# Patient Record
Sex: FEMALE | Race: WHITE | NOT HISPANIC OR LATINO | Employment: FULL TIME | URBAN - METROPOLITAN AREA
[De-identification: names, ages, dates, MRNs, and addresses within clinical notes are randomized per-mention and may not be internally consistent; named-entity substitution may affect disease eponyms.]

---

## 2017-03-04 DIAGNOSIS — F32.A DEPRESSION: ICD-10-CM

## 2017-03-06 RX ORDER — CITALOPRAM 20 MG/1
TABLET, FILM COATED ORAL
Qty: 90 TABLET | Refills: 3 | Status: SHIPPED | OUTPATIENT
Start: 2017-03-06 | End: 2017-12-26 | Stop reason: SDUPTHER

## 2017-05-26 ENCOUNTER — TELEPHONE (OUTPATIENT)
Dept: FAMILY MEDICINE | Facility: CLINIC | Age: 37
End: 2017-05-26

## 2017-05-26 DIAGNOSIS — E03.9 HYPOTHYROIDISM, UNSPECIFIED TYPE: Primary | ICD-10-CM

## 2017-05-26 NOTE — TELEPHONE ENCOUNTER
----- Message from RT Ele sent at 5/26/2017  9:41 AM CDT -----  Contact: pt    pt , requesting a call back to confirm if her lab test orders were approved, thanks.

## 2017-05-26 NOTE — TELEPHONE ENCOUNTER
----- Message from Irasema Coto sent at 5/25/2017 11:43 AM CDT -----  Contact: patient  Patient calling in regards to requesting a lab order be put in to check her Thyroid levels. Call the patient when the order is put in. Please advise.  Call back   Thanks!

## 2017-05-29 ENCOUNTER — LAB VISIT (OUTPATIENT)
Dept: LAB | Facility: HOSPITAL | Age: 37
End: 2017-05-29
Attending: INTERNAL MEDICINE
Payer: COMMERCIAL

## 2017-05-29 DIAGNOSIS — Z13.9 ENCOUNTER FOR SCREENING, UNSPECIFIED: ICD-10-CM

## 2017-05-29 DIAGNOSIS — E03.9 HYPOTHYROIDISM, UNSPECIFIED TYPE: ICD-10-CM

## 2017-05-29 DIAGNOSIS — E03.4 HYPOTHYROIDISM DUE TO ACQUIRED ATROPHY OF THYROID: ICD-10-CM

## 2017-05-29 LAB
T4 SERPL-MCNC: 4.8 UG/DL
TSH SERPL DL<=0.005 MIU/L-ACNC: 1.65 UIU/ML

## 2017-05-29 PROCEDURE — 86803 HEPATITIS C AB TEST: CPT

## 2017-05-29 PROCEDURE — 36415 COLL VENOUS BLD VENIPUNCTURE: CPT | Mod: PO

## 2017-05-29 PROCEDURE — 84436 ASSAY OF TOTAL THYROXINE: CPT

## 2017-05-29 PROCEDURE — 84443 ASSAY THYROID STIM HORMONE: CPT

## 2017-05-30 LAB — HCV AB SERPL QL IA: NEGATIVE

## 2017-07-02 DIAGNOSIS — E03.4 HYPOTHYROIDISM DUE TO ACQUIRED ATROPHY OF THYROID: ICD-10-CM

## 2017-07-03 RX ORDER — LEVOTHYROXINE SODIUM 75 UG/1
TABLET ORAL
Qty: 90 TABLET | Refills: 0 | Status: SHIPPED | OUTPATIENT
Start: 2017-07-03 | End: 2017-10-07 | Stop reason: SDUPTHER

## 2017-10-07 DIAGNOSIS — E03.4 HYPOTHYROIDISM DUE TO ACQUIRED ATROPHY OF THYROID: ICD-10-CM

## 2017-10-09 NOTE — PROGRESS NOTES
Refill Authorization Note     is requesting a refill authorization.    Brief assessment and rational for refill: APPROVE: prr  Name of medication: LEVOTHYROXINE 0.075MG (75MCG) TABS  How patient will take medication: t1t po daily   Amount/Quantity of medication ordered: 90d   Medication reconciliation completed: No        Refills Authorized: Yes  If authorized number of refills: 1        Medication Therapy Plan: last tsh WNL.  Approve 6 mo   Comments:   Lab Results   Component Value Date    TSH 1.646 05/29/2017    W8NZQBT 4.8 05/29/2017

## 2017-10-12 RX ORDER — LEVOTHYROXINE SODIUM 75 UG/1
TABLET ORAL
Qty: 90 TABLET | Refills: 1 | Status: SHIPPED | OUTPATIENT
Start: 2017-10-12 | End: 2017-12-26 | Stop reason: SDUPTHER

## 2017-12-21 ENCOUNTER — TELEPHONE (OUTPATIENT)
Dept: FAMILY MEDICINE | Facility: CLINIC | Age: 37
End: 2017-12-21

## 2017-12-21 DIAGNOSIS — F32.A DEPRESSION, UNSPECIFIED DEPRESSION TYPE: ICD-10-CM

## 2017-12-21 DIAGNOSIS — E03.9 HYPOTHYROIDISM, UNSPECIFIED TYPE: Primary | ICD-10-CM

## 2017-12-21 NOTE — TELEPHONE ENCOUNTER
Ordered, please link all labs I ordered together; advise her she is due for cholesterol as well, complete labs fasting

## 2017-12-21 NOTE — TELEPHONE ENCOUNTER
----- Message from Amira Ruiz sent at 12/21/2017  2:07 PM CST -----  Patient is calling concerning if she needs an appointment before she can get another year of refills of levothyroxine (SYNTHROID) 75 MCG tablet and citalopram (CELEXA) 20 MG tablet. Please call to advise or remit to:      [facility] [address] [place] [address] & [address] [address]  Phone: [phone] Fax: [phone]    Call when completed at [phone].

## 2017-12-21 NOTE — TELEPHONE ENCOUNTER
Spoke with pt. Scheduled appt with Sonia Latif NP for 12/26/17. Pt is requesting TSH prior.    Pended. Pt plans to walk in tomorrow or Saturday to have completed.

## 2017-12-22 ENCOUNTER — LAB VISIT (OUTPATIENT)
Dept: LAB | Facility: HOSPITAL | Age: 37
End: 2017-12-22
Attending: INTERNAL MEDICINE
Payer: COMMERCIAL

## 2017-12-22 DIAGNOSIS — F32.A DEPRESSION, UNSPECIFIED DEPRESSION TYPE: ICD-10-CM

## 2017-12-22 DIAGNOSIS — E03.9 HYPOTHYROIDISM, UNSPECIFIED TYPE: ICD-10-CM

## 2017-12-22 LAB
ALBUMIN SERPL BCP-MCNC: 3.8 G/DL
ALP SERPL-CCNC: 77 U/L
ALT SERPL W/O P-5'-P-CCNC: 26 U/L
ANION GAP SERPL CALC-SCNC: 8 MMOL/L
AST SERPL-CCNC: 21 U/L
BASOPHILS # BLD AUTO: 0.04 K/UL
BASOPHILS NFR BLD: 0.7 %
BILIRUB SERPL-MCNC: 0.4 MG/DL
BUN SERPL-MCNC: 11 MG/DL
CALCIUM SERPL-MCNC: 9.4 MG/DL
CHLORIDE SERPL-SCNC: 108 MMOL/L
CHOLEST SERPL-MCNC: 184 MG/DL
CHOLEST/HDLC SERPL: 3 {RATIO}
CO2 SERPL-SCNC: 24 MMOL/L
CREAT SERPL-MCNC: 0.8 MG/DL
DIFFERENTIAL METHOD: NORMAL
EOSINOPHIL # BLD AUTO: 0.2 K/UL
EOSINOPHIL NFR BLD: 3 %
ERYTHROCYTE [DISTWIDTH] IN BLOOD BY AUTOMATED COUNT: 12.8 %
EST. GFR  (AFRICAN AMERICAN): >60 ML/MIN/1.73 M^2
EST. GFR  (NON AFRICAN AMERICAN): >60 ML/MIN/1.73 M^2
GLUCOSE SERPL-MCNC: 93 MG/DL
HCT VFR BLD AUTO: 39.2 %
HDLC SERPL-MCNC: 61 MG/DL
HDLC SERPL: 33.2 %
HGB BLD-MCNC: 13.5 G/DL
IMM GRANULOCYTES # BLD AUTO: 0.01 K/UL
IMM GRANULOCYTES NFR BLD AUTO: 0.2 %
LDLC SERPL CALC-MCNC: 109.8 MG/DL
LYMPHOCYTES # BLD AUTO: 2 K/UL
LYMPHOCYTES NFR BLD: 34.2 %
MCH RBC QN AUTO: 30.4 PG
MCHC RBC AUTO-ENTMCNC: 34.4 G/DL
MCV RBC AUTO: 88 FL
MONOCYTES # BLD AUTO: 0.5 K/UL
MONOCYTES NFR BLD: 8.4 %
NEUTROPHILS # BLD AUTO: 3.1 K/UL
NEUTROPHILS NFR BLD: 53.5 %
NONHDLC SERPL-MCNC: 123 MG/DL
NRBC BLD-RTO: 0 /100 WBC
PLATELET # BLD AUTO: 230 K/UL
PMV BLD AUTO: 11.3 FL
POTASSIUM SERPL-SCNC: 4.4 MMOL/L
PROT SERPL-MCNC: 7.2 G/DL
RBC # BLD AUTO: 4.44 M/UL
SODIUM SERPL-SCNC: 140 MMOL/L
TRIGL SERPL-MCNC: 66 MG/DL
TSH SERPL DL<=0.005 MIU/L-ACNC: 2.65 UIU/ML
WBC # BLD AUTO: 5.71 K/UL

## 2017-12-22 PROCEDURE — 80061 LIPID PANEL: CPT

## 2017-12-22 PROCEDURE — 80053 COMPREHEN METABOLIC PANEL: CPT

## 2017-12-22 PROCEDURE — 36415 COLL VENOUS BLD VENIPUNCTURE: CPT | Mod: PO

## 2017-12-22 PROCEDURE — 84443 ASSAY THYROID STIM HORMONE: CPT

## 2017-12-22 PROCEDURE — 85025 COMPLETE CBC W/AUTO DIFF WBC: CPT

## 2017-12-26 ENCOUNTER — OFFICE VISIT (OUTPATIENT)
Dept: FAMILY MEDICINE | Facility: CLINIC | Age: 37
End: 2017-12-26
Payer: COMMERCIAL

## 2017-12-26 VITALS
OXYGEN SATURATION: 98 % | HEART RATE: 72 BPM | HEIGHT: 68 IN | DIASTOLIC BLOOD PRESSURE: 70 MMHG | SYSTOLIC BLOOD PRESSURE: 112 MMHG | TEMPERATURE: 99 F | WEIGHT: 271.81 LBS | RESPIRATION RATE: 16 BRPM | BODY MASS INDEX: 41.19 KG/M2

## 2017-12-26 DIAGNOSIS — E03.4 HYPOTHYROIDISM DUE TO ACQUIRED ATROPHY OF THYROID: ICD-10-CM

## 2017-12-26 DIAGNOSIS — F32.A DEPRESSION, UNSPECIFIED DEPRESSION TYPE: ICD-10-CM

## 2017-12-26 PROCEDURE — 99999 PR PBB SHADOW E&M-EST. PATIENT-LVL III: CPT | Mod: PBBFAC,,, | Performed by: NURSE PRACTITIONER

## 2017-12-26 PROCEDURE — 99214 OFFICE O/P EST MOD 30 MIN: CPT | Mod: S$GLB,,, | Performed by: NURSE PRACTITIONER

## 2017-12-26 RX ORDER — CITALOPRAM 20 MG/1
TABLET, FILM COATED ORAL
Qty: 90 TABLET | Refills: 1 | Status: SHIPPED | OUTPATIENT
Start: 2017-12-26 | End: 2018-04-28 | Stop reason: SDUPTHER

## 2017-12-26 RX ORDER — LEVOTHYROXINE SODIUM 75 UG/1
TABLET ORAL
Qty: 90 TABLET | Refills: 3 | Status: SHIPPED | OUTPATIENT
Start: 2017-12-26 | End: 2018-05-14 | Stop reason: SDUPTHER

## 2017-12-26 RX ORDER — TRANEXAMIC ACID 650 MG/1
650 TABLET ORAL ONCE
COMMUNITY
Start: 2017-12-21 | End: 2018-07-12

## 2017-12-26 NOTE — PROGRESS NOTES
Subjective:       Patient ID: Jimena Lezama is a 36 y.o. female.    Chief Complaint: Hypothyroidism (f/u)    HPI   Ms. Lezama is a new patient to me. She presents today for lab review, hypothyroidism and depression FU    Labs all WNL  Hypothyroid-controlled on 75mcg levothyroxine  Depression: controlled on 30mg celexa   Vitals:    12/26/17 0937   BP: 112/70   Pulse: 72   Resp: 16   Temp: 99.3 °F (37.4 °C)     Review of Systems   Constitutional: Negative.  Negative for diaphoresis and fever.   HENT: Negative.  Negative for facial swelling and trouble swallowing.    Eyes: Negative.  Negative for discharge and redness.   Respiratory: Negative.  Negative for cough and shortness of breath.    Cardiovascular: Negative.  Negative for chest pain and palpitations.   Gastrointestinal: Negative.  Negative for abdominal pain and vomiting.   Genitourinary: Negative.  Negative for difficulty urinating and flank pain.   Musculoskeletal: Negative.  Negative for back pain and neck pain.   Skin: Negative.  Negative for rash and wound.   Neurological: Negative.  Negative for dizziness and light-headedness.   Hematological: Negative.    Psychiatric/Behavioral: Negative.  Negative for confusion. The patient is not nervous/anxious.        Past Medical History:   Diagnosis Date    Hypothyroid     Irritable bowel     Obesity      Objective:      Physical Exam   Constitutional: She is oriented to person, place, and time. She does not have a sickly appearance. No distress.   HENT:   Head: Normocephalic.   Right Ear: Hearing, tympanic membrane, external ear and ear canal normal.   Left Ear: Hearing, tympanic membrane, external ear and ear canal normal.   Nose: Nose normal.   Mouth/Throat: Uvula is midline, oropharynx is clear and moist and mucous membranes are normal.   Eyes: Conjunctivae and lids are normal.   Neck: Trachea normal and normal range of motion. Neck supple. No JVD present. No tracheal deviation present.    Cardiovascular: Normal rate, regular rhythm, S1 normal, S2 normal and normal heart sounds.    Pulmonary/Chest: Effort normal and breath sounds normal. She exhibits no tenderness.   Abdominal: Normal appearance. She exhibits no distension.   Musculoskeletal: Normal range of motion. She exhibits no edema or deformity.   Neurological: She is alert and oriented to person, place, and time.   Skin: Skin is warm and dry. She is not diaphoretic. No pallor.   Psychiatric: She has a normal mood and affect. Her speech is normal and behavior is normal. Judgment and thought content normal. Cognition and memory are normal.   Nursing note and vitals reviewed.      Assessment:       1. Hypothyroidism due to acquired atrophy of thyroid    2. Depression, unspecified depression type        Plan:       Hypothyroidism due to acquired atrophy of thyroid  -     levothyroxine (SYNTHROID) 75 MCG tablet; TAKE 1 TABLET(75 MCG) BY MOUTH EVERY DAY  Dispense: 90 tablet; Refill: 3    Depression, unspecified depression type  -     citalopram (CELEXA) 20 MG tablet; TAKE 1 1/2 TABLET(30 MG) BY MOUTH EVERY DAY  Dispense: 90 tablet; Refill: 1  -     Comprehensive metabolic panel; Future; Expected date: 12/26/2017        Return in about 6 months (around 6/26/2018) for annual with PCP.   FU with me PRN

## 2018-01-10 ENCOUNTER — IMMUNIZATION (OUTPATIENT)
Dept: URGENT CARE | Facility: CLINIC | Age: 38
End: 2018-01-10
Payer: COMMERCIAL

## 2018-01-10 PROCEDURE — 90686 IIV4 VACC NO PRSV 0.5 ML IM: CPT | Mod: S$GLB,,, | Performed by: EMERGENCY MEDICINE

## 2018-01-10 PROCEDURE — 90471 IMMUNIZATION ADMIN: CPT | Mod: S$GLB,,, | Performed by: EMERGENCY MEDICINE

## 2018-04-28 ENCOUNTER — TELEPHONE (OUTPATIENT)
Dept: FAMILY MEDICINE | Facility: CLINIC | Age: 38
End: 2018-04-28

## 2018-04-28 DIAGNOSIS — F32.A DEPRESSION, UNSPECIFIED DEPRESSION TYPE: ICD-10-CM

## 2018-04-29 RX ORDER — CITALOPRAM 20 MG/1
TABLET, FILM COATED ORAL
Qty: 30 TABLET | Refills: 0 | Status: SHIPPED | OUTPATIENT
Start: 2018-04-29 | End: 2018-05-14 | Stop reason: SDUPTHER

## 2018-05-14 DIAGNOSIS — F32.A DEPRESSION, UNSPECIFIED DEPRESSION TYPE: ICD-10-CM

## 2018-05-14 DIAGNOSIS — E03.4 HYPOTHYROIDISM DUE TO ACQUIRED ATROPHY OF THYROID: ICD-10-CM

## 2018-05-14 RX ORDER — LEVOTHYROXINE SODIUM 75 UG/1
TABLET ORAL
Qty: 90 TABLET | Refills: 1 | Status: SHIPPED | OUTPATIENT
Start: 2018-05-14 | End: 2018-07-12 | Stop reason: SDUPTHER

## 2018-05-14 RX ORDER — CITALOPRAM 20 MG/1
20 TABLET, FILM COATED ORAL DAILY
Qty: 90 TABLET | Refills: 1 | Status: SHIPPED | OUTPATIENT
Start: 2018-05-14 | End: 2018-07-12 | Stop reason: SDUPTHER

## 2018-07-12 ENCOUNTER — OFFICE VISIT (OUTPATIENT)
Dept: FAMILY MEDICINE | Facility: CLINIC | Age: 38
End: 2018-07-12
Payer: COMMERCIAL

## 2018-07-12 ENCOUNTER — LAB VISIT (OUTPATIENT)
Dept: LAB | Facility: HOSPITAL | Age: 38
End: 2018-07-12
Attending: NURSE PRACTITIONER
Payer: COMMERCIAL

## 2018-07-12 VITALS
OXYGEN SATURATION: 97 % | SYSTOLIC BLOOD PRESSURE: 98 MMHG | DIASTOLIC BLOOD PRESSURE: 70 MMHG | RESPIRATION RATE: 16 BRPM | TEMPERATURE: 98 F | HEART RATE: 62 BPM | HEIGHT: 68 IN | BODY MASS INDEX: 41.28 KG/M2 | WEIGHT: 272.38 LBS

## 2018-07-12 DIAGNOSIS — E03.4 HYPOTHYROIDISM DUE TO ACQUIRED ATROPHY OF THYROID: ICD-10-CM

## 2018-07-12 DIAGNOSIS — F32.A DEPRESSION, UNSPECIFIED DEPRESSION TYPE: ICD-10-CM

## 2018-07-12 LAB
ALBUMIN SERPL BCP-MCNC: 4.3 G/DL
ALP SERPL-CCNC: 73 U/L
ALT SERPL W/O P-5'-P-CCNC: 18 U/L
ANION GAP SERPL CALC-SCNC: 7 MMOL/L
AST SERPL-CCNC: 22 U/L
BILIRUB SERPL-MCNC: 0.4 MG/DL
BUN SERPL-MCNC: 12 MG/DL
CALCIUM SERPL-MCNC: 9.5 MG/DL
CHLORIDE SERPL-SCNC: 107 MMOL/L
CO2 SERPL-SCNC: 25 MMOL/L
CREAT SERPL-MCNC: 0.8 MG/DL
EST. GFR  (AFRICAN AMERICAN): >60 ML/MIN/1.73 M^2
EST. GFR  (NON AFRICAN AMERICAN): >60 ML/MIN/1.73 M^2
GLUCOSE SERPL-MCNC: 100 MG/DL
POTASSIUM SERPL-SCNC: 4.2 MMOL/L
PROT SERPL-MCNC: 7.4 G/DL
SODIUM SERPL-SCNC: 139 MMOL/L
TSH SERPL DL<=0.005 MIU/L-ACNC: 1.7 UIU/ML

## 2018-07-12 PROCEDURE — 84443 ASSAY THYROID STIM HORMONE: CPT

## 2018-07-12 PROCEDURE — 80053 COMPREHEN METABOLIC PANEL: CPT

## 2018-07-12 PROCEDURE — 3008F BODY MASS INDEX DOCD: CPT | Mod: CPTII,S$GLB,, | Performed by: NURSE PRACTITIONER

## 2018-07-12 PROCEDURE — 99214 OFFICE O/P EST MOD 30 MIN: CPT | Mod: S$GLB,,, | Performed by: NURSE PRACTITIONER

## 2018-07-12 PROCEDURE — 99999 PR PBB SHADOW E&M-EST. PATIENT-LVL IV: CPT | Mod: PBBFAC,,, | Performed by: NURSE PRACTITIONER

## 2018-07-12 PROCEDURE — 36415 COLL VENOUS BLD VENIPUNCTURE: CPT | Mod: PO

## 2018-07-12 RX ORDER — LEVOTHYROXINE SODIUM 75 UG/1
TABLET ORAL
Qty: 90 TABLET | Refills: 1 | Status: SHIPPED | OUTPATIENT
Start: 2018-07-12 | End: 2019-02-03 | Stop reason: SDUPTHER

## 2018-07-12 RX ORDER — CITALOPRAM 20 MG/1
20 TABLET, FILM COATED ORAL DAILY
Qty: 90 TABLET | Refills: 1 | Status: SHIPPED | OUTPATIENT
Start: 2018-07-12 | End: 2018-11-12 | Stop reason: SDUPTHER

## 2018-07-12 NOTE — PROGRESS NOTES
Subjective:       Patient ID: Jimena Lezama is a 37 y.o. female.    Chief Complaint: Medication Refill    Ms. Lezama is a known patient to me. She presents today for 6 month follow up    HPI   Hypothyroidism: stable  Depression: controlled on celexa; due for labs    She is without complaints  Vitals:    07/12/18 0929   BP: 98/70   Pulse: 62   Resp: 16   Temp: 98.4 °F (36.9 °C)     Review of Systems   Constitutional: Negative for diaphoresis and fever.   HENT: Negative for facial swelling and trouble swallowing.    Eyes: Negative for discharge and redness.   Respiratory: Negative for cough and shortness of breath.    Cardiovascular: Negative for chest pain and palpitations.   Gastrointestinal: Negative for diarrhea and nausea.   Genitourinary: Negative for difficulty urinating.   Musculoskeletal: Negative for gait problem.   Skin: Negative for rash and wound.   Neurological: Negative for dizziness, facial asymmetry and speech difficulty.   Psychiatric/Behavioral: Negative for confusion. The patient is not nervous/anxious.        Past Medical History:   Diagnosis Date    Hypothyroid     Irritable bowel     Obesity      Objective:      Physical Exam   Constitutional: She is oriented to person, place, and time. She does not have a sickly appearance. No distress.   HENT:   Head: Normocephalic.   Right Ear: Hearing normal.   Left Ear: Hearing normal.   Nose: Nose normal.   Eyes: Conjunctivae and lids are normal.   Neck: No JVD present. No tracheal deviation present.   Cardiovascular: Normal rate and normal heart sounds.    Pulmonary/Chest: Effort normal and breath sounds normal.   Abdominal: Normal appearance. She exhibits no distension.   Musculoskeletal: She exhibits no deformity.   Neurological: She is alert and oriented to person, place, and time.   Skin: She is not diaphoretic. No pallor.   Psychiatric: She has a normal mood and affect. Her speech is normal and behavior is normal. Judgment and thought  content normal. Cognition and memory are normal.   Nursing note and vitals reviewed.      Assessment:       1. Depression, unspecified depression type    2. Hypothyroidism due to acquired atrophy of thyroid        Plan:       Depression, unspecified depression type  -     citalopram (CELEXA) 20 MG tablet; Take 1 tablet (20 mg total) by mouth once daily.  Dispense: 90 tablet; Refill: 1    Hypothyroidism due to acquired atrophy of thyroid  -     levothyroxine (SYNTHROID) 75 MCG tablet; TAKE 1 TABLET(75 MCG) BY MOUTH EVERY DAY  Dispense: 90 tablet; Refill: 1  -     TSH; Future; Expected date: 07/12/2018      Follow-up in about 6 months (around 1/12/2019) for annual with PCP, with me sooner as needed.

## 2018-11-12 ENCOUNTER — TELEPHONE (OUTPATIENT)
Dept: FAMILY MEDICINE | Facility: CLINIC | Age: 38
End: 2018-11-12

## 2018-11-12 DIAGNOSIS — F32.A DEPRESSION, UNSPECIFIED DEPRESSION TYPE: ICD-10-CM

## 2018-11-12 RX ORDER — CITALOPRAM 20 MG/1
TABLET, FILM COATED ORAL
Qty: 90 TABLET | Refills: 0 | Status: SHIPPED | OUTPATIENT
Start: 2018-11-12 | End: 2019-04-24 | Stop reason: SDUPTHER

## 2018-12-06 ENCOUNTER — OFFICE VISIT (OUTPATIENT)
Dept: FAMILY MEDICINE | Facility: CLINIC | Age: 38
End: 2018-12-06
Payer: COMMERCIAL

## 2018-12-06 VITALS
TEMPERATURE: 98 F | BODY MASS INDEX: 37.44 KG/M2 | HEIGHT: 68 IN | SYSTOLIC BLOOD PRESSURE: 112 MMHG | WEIGHT: 247 LBS | OXYGEN SATURATION: 98 % | DIASTOLIC BLOOD PRESSURE: 78 MMHG | HEART RATE: 53 BPM

## 2018-12-06 DIAGNOSIS — J02.9 PHARYNGITIS, UNSPECIFIED ETIOLOGY: Primary | ICD-10-CM

## 2018-12-06 LAB
FLUAV AG SPEC QL IA: NEGATIVE
FLUBV AG SPEC QL IA: NEGATIVE
SPECIMEN SOURCE: NORMAL

## 2018-12-06 PROCEDURE — 87400 INFLUENZA A/B EACH AG IA: CPT | Mod: PO

## 2018-12-06 PROCEDURE — 87070 CULTURE OTHR SPECIMN AEROBIC: CPT

## 2018-12-06 PROCEDURE — 3008F BODY MASS INDEX DOCD: CPT | Mod: CPTII,S$GLB,, | Performed by: NURSE PRACTITIONER

## 2018-12-06 PROCEDURE — 99999 PR PBB SHADOW E&M-EST. PATIENT-LVL III: CPT | Mod: PBBFAC,,, | Performed by: NURSE PRACTITIONER

## 2018-12-06 PROCEDURE — 99213 OFFICE O/P EST LOW 20 MIN: CPT | Mod: S$GLB,,, | Performed by: NURSE PRACTITIONER

## 2018-12-06 RX ORDER — PROMETHAZINE HYDROCHLORIDE AND DEXTROMETHORPHAN HYDROBROMIDE 6.25; 15 MG/5ML; MG/5ML
5 SYRUP ORAL 3 TIMES DAILY PRN
Qty: 240 ML | Refills: 0 | Status: SHIPPED | OUTPATIENT
Start: 2018-12-06 | End: 2018-12-16

## 2018-12-06 NOTE — PROGRESS NOTES
Subjective:       Patient ID: Jimena Lezama is a 37 y.o. female.    Chief Complaint: Sore Throat    Patient is having sore throat x 3 days, taking advil/tylenol.       Review of Systems   Constitutional: Negative for fatigue.   HENT: Positive for ear pain. Negative for postnasal drip, rhinorrhea, sinus pressure and sinus pain.    Respiratory: Positive for cough. Negative for shortness of breath and wheezing.    Musculoskeletal: Positive for myalgias and neck pain.   Neurological: Positive for headaches.       Objective:      Physical Exam   Constitutional: She is oriented to person, place, and time. No distress.   HENT:   Head: Normocephalic and atraumatic.   Eyes: Pupils are equal, round, and reactive to light.   Cardiovascular: Normal rate and regular rhythm.   Pulmonary/Chest: Effort normal and breath sounds normal. No stridor. No respiratory distress.   Musculoskeletal: She exhibits no edema.   Neurological: She is alert and oriented to person, place, and time.   Skin: She is not diaphoretic.   Psychiatric: She has a normal mood and affect. Her behavior is normal.   Vitals reviewed.      Assessment:       1. Pharyngitis, unspecified etiology        Plan:       1. Pharyngitis, unspecified etiology  Claritin and mucinex daily, magic mouthwash and phenergan dm prn. Follow up if symptoms are lasting more than 7 days or if they get worse.   - POCT Rapid Strep A  - Influenza antigen Nasopharyngeal Swab  - Throat culture  - promethazine-dextromethorphan (PROMETHAZINE-DM) 6.25-15 mg/5 mL Syrp; Take 5 mLs by mouth 3 (three) times daily as needed.  Dispense: 240 mL; Refill: 0  - (Magic mouthwash) 1:1:1 Benadryl 12.5mg/5ml liq, aluminum & magnesium hydroxide-simehticone (Maalox), lidocaine viscous 2%; 5-10 ml gargle and swallow every 4 hours as needed  Dispense: 450 mL; Refill: 0

## 2018-12-10 LAB — BACTERIA THROAT CULT: NORMAL

## 2018-12-13 ENCOUNTER — PATIENT MESSAGE (OUTPATIENT)
Dept: FAMILY MEDICINE | Facility: CLINIC | Age: 38
End: 2018-12-13

## 2019-02-03 DIAGNOSIS — E03.4 HYPOTHYROIDISM DUE TO ACQUIRED ATROPHY OF THYROID: ICD-10-CM

## 2019-02-03 RX ORDER — LEVOTHYROXINE SODIUM 75 UG/1
TABLET ORAL
Qty: 90 TABLET | Refills: 0 | Status: SHIPPED | OUTPATIENT
Start: 2019-02-03 | End: 2019-04-24 | Stop reason: SDUPTHER

## 2019-04-10 ENCOUNTER — PATIENT OUTREACH (OUTPATIENT)
Dept: ADMINISTRATIVE | Facility: HOSPITAL | Age: 39
End: 2019-04-10

## 2019-04-24 ENCOUNTER — LAB VISIT (OUTPATIENT)
Dept: LAB | Facility: HOSPITAL | Age: 39
End: 2019-04-24
Attending: INTERNAL MEDICINE
Payer: COMMERCIAL

## 2019-04-24 ENCOUNTER — OFFICE VISIT (OUTPATIENT)
Dept: FAMILY MEDICINE | Facility: CLINIC | Age: 39
End: 2019-04-24
Payer: COMMERCIAL

## 2019-04-24 VITALS
HEART RATE: 54 BPM | OXYGEN SATURATION: 97 % | DIASTOLIC BLOOD PRESSURE: 74 MMHG | HEIGHT: 68 IN | BODY MASS INDEX: 35.09 KG/M2 | WEIGHT: 231.5 LBS | SYSTOLIC BLOOD PRESSURE: 110 MMHG

## 2019-04-24 DIAGNOSIS — F32.A DEPRESSION, UNSPECIFIED DEPRESSION TYPE: ICD-10-CM

## 2019-04-24 DIAGNOSIS — Z00.00 ROUTINE PHYSICAL EXAMINATION: Primary | ICD-10-CM

## 2019-04-24 DIAGNOSIS — E03.4 HYPOTHYROIDISM DUE TO ACQUIRED ATROPHY OF THYROID: ICD-10-CM

## 2019-04-24 PROCEDURE — 99396 PR PREVENTIVE VISIT,EST,40-64: ICD-10-PCS | Mod: S$GLB,,, | Performed by: INTERNAL MEDICINE

## 2019-04-24 PROCEDURE — 99999 PR PBB SHADOW E&M-EST. PATIENT-LVL III: CPT | Mod: PBBFAC,,, | Performed by: INTERNAL MEDICINE

## 2019-04-24 PROCEDURE — 84443 ASSAY THYROID STIM HORMONE: CPT

## 2019-04-24 PROCEDURE — 99396 PREV VISIT EST AGE 40-64: CPT | Mod: S$GLB,,, | Performed by: INTERNAL MEDICINE

## 2019-04-24 PROCEDURE — 99999 PR PBB SHADOW E&M-EST. PATIENT-LVL III: ICD-10-PCS | Mod: PBBFAC,,, | Performed by: INTERNAL MEDICINE

## 2019-04-24 PROCEDURE — 36415 COLL VENOUS BLD VENIPUNCTURE: CPT | Mod: PO

## 2019-04-24 RX ORDER — CITALOPRAM 10 MG/1
10 TABLET ORAL DAILY
Qty: 30 TABLET | Refills: 11 | Status: SHIPPED | OUTPATIENT
Start: 2019-04-24 | End: 2019-05-24

## 2019-04-24 RX ORDER — LEVOTHYROXINE SODIUM 75 UG/1
75 TABLET ORAL
Qty: 90 TABLET | Refills: 3 | Status: SHIPPED | OUTPATIENT
Start: 2019-04-24 | End: 2020-01-02

## 2019-04-24 RX ORDER — CITALOPRAM 20 MG/1
TABLET, FILM COATED ORAL
Qty: 90 TABLET | Refills: 1 | Status: SHIPPED | OUTPATIENT
Start: 2019-04-24 | End: 2020-07-07 | Stop reason: SDUPTHER

## 2019-04-24 NOTE — PROGRESS NOTES
Subjective:       Patient ID: Jimena Lezama is a 38 y.o. female.    Chief Complaint: Annual Exam    Here for routine health maintenance.    Hypothyroid - controlled, but need check today  Depression - controlled; trouble weaning off.  Would like to stop it.     Review of Systems   Constitutional: Negative for appetite change and fever.   HENT: Negative for nosebleeds and trouble swallowing.    Eyes: Negative for discharge and visual disturbance.   Respiratory: Negative for choking and shortness of breath.    Cardiovascular: Negative for chest pain and palpitations.   Gastrointestinal: Negative for abdominal pain, nausea and vomiting.   Musculoskeletal: Negative for arthralgias and joint swelling.   Skin: Negative for rash and wound.   Neurological: Negative for dizziness and syncope.   Psychiatric/Behavioral: Negative for confusion and dysphoric mood.       Objective:      Vitals:    04/24/19 1656   BP: 110/74   Pulse: (!) 54     Physical Exam   Constitutional: She appears well-nourished.   Eyes: Conjunctivae and EOM are normal.   Neck: Trachea normal and normal range of motion. No thyromegaly present.   Cardiovascular: Normal heart sounds.   Edema negative   Pulmonary/Chest: Effort normal and breath sounds normal.   Abdominal: Soft. There is no hepatomegaly.   Musculoskeletal:   ROM normal bilateral  Strength normal bilateral   Neurological: She has normal reflexes. No cranial nerve deficit.   Skin: Skin is warm, dry and intact.   Psychiatric: She has a normal mood and affect.   Alert and Oriented    Vitals reviewed.        Assessment:       1. Routine physical examination    2. Hypothyroidism due to acquired atrophy of thyroid    3. Depression, unspecified depression type        Plan:       Routine physical examination  -     TSH; Future; Expected date: 10/21/2019  -     Lipid panel; Future; Expected date: 10/21/2019  -     Comprehensive metabolic panel; Future; Expected date: 10/21/2019  -     CBC auto  differential; Future; Expected date: 10/21/2019    Hypothyroidism due to acquired atrophy of thyroid  -     levothyroxine (SYNTHROID) 75 MCG tablet; Take 1 tablet (75 mcg total) by mouth before breakfast.  Dispense: 90 tablet; Refill: 3  -     TSH; Future; Expected date: 04/24/2019    Depression, unspecified depression type  -     citalopram (CELEXA) 10 MG tablet; Take 1 tablet (10 mg total) by mouth once daily.  Dispense: 30 tablet; Refill: 11  -     citalopram (CELEXA) 20 MG tablet; TAKE 1 TABLET(20 MG) BY MOUTH EVERY DAY  Dispense: 90 tablet; Refill: 1            Medication List with Changes/Refills   New Medications    CITALOPRAM (CELEXA) 10 MG TABLET    Take 1 tablet (10 mg total) by mouth once daily.   Current Medications    (MAGIC MOUTHWASH) 1:1:1 BENADRYL 12.5MG/5ML LIQ, ALUMINUM & MAGNESIUM HYDROXIDE-SIMEHTICONE (MAALOX), LIDOCAINE VISCOUS 2%    5-10 ml gargle and swallow every 4 hours as needed   Changed and/or Refilled Medications    Modified Medication Previous Medication    CITALOPRAM (CELEXA) 20 MG TABLET citalopram (CELEXA) 20 MG tablet       TAKE 1 TABLET(20 MG) BY MOUTH EVERY DAY    TAKE 1 TABLET(20 MG) BY MOUTH EVERY DAY    LEVOTHYROXINE (SYNTHROID) 75 MCG TABLET levothyroxine (SYNTHROID) 75 MCG tablet       Take 1 tablet (75 mcg total) by mouth before breakfast.    TAKE 1 TABLET(75 MCG) BY MOUTH EVERY DAY     Wellness reviewed  Will try wean slowly off Celexa.  Continue current management and monitor.    Counseled on regular exercise, maintenance of a healthy weight, balanced diet rich in fruits/vegetables and lean protein, and avoidance of unhealthy habits like smoking and excessive alcohol intake.   Also, counseled on importance of being compliant with medication, health appointments, diet and exercise.     Follow up in about 6 months (around 10/24/2019).  Sonia in 6 mo me in 1 yr

## 2019-04-25 LAB — TSH SERPL DL<=0.005 MIU/L-ACNC: 1.27 UIU/ML (ref 0.4–4)

## 2019-10-24 ENCOUNTER — PATIENT MESSAGE (OUTPATIENT)
Dept: FAMILY MEDICINE | Facility: CLINIC | Age: 39
End: 2019-10-24

## 2019-10-28 ENCOUNTER — PATIENT MESSAGE (OUTPATIENT)
Dept: FAMILY MEDICINE | Facility: CLINIC | Age: 39
End: 2019-10-28

## 2019-11-11 ENCOUNTER — PATIENT OUTREACH (OUTPATIENT)
Dept: ADMINISTRATIVE | Facility: HOSPITAL | Age: 39
End: 2019-11-11

## 2019-11-11 NOTE — PROGRESS NOTES
There are no preventive care reminders to display for this patient.  Future Appointments   Date Time Provider Department Center   11/25/2019  1:10 PM Jeffry Helms MD [facility]

## 2019-12-18 ENCOUNTER — PATIENT MESSAGE (OUTPATIENT)
Dept: FAMILY MEDICINE | Facility: CLINIC | Age: 39
End: 2019-12-18

## 2019-12-19 ENCOUNTER — LAB VISIT (OUTPATIENT)
Dept: LAB | Facility: HOSPITAL | Age: 39
End: 2019-12-19
Attending: INTERNAL MEDICINE
Payer: COMMERCIAL

## 2019-12-19 DIAGNOSIS — Z00.00 ROUTINE PHYSICAL EXAMINATION: ICD-10-CM

## 2019-12-19 LAB
ALBUMIN SERPL BCP-MCNC: 3.9 G/DL (ref 3.5–5.2)
ALP SERPL-CCNC: 70 U/L (ref 55–135)
ALT SERPL W/O P-5'-P-CCNC: 16 U/L (ref 10–44)
ANION GAP SERPL CALC-SCNC: 4 MMOL/L (ref 8–16)
AST SERPL-CCNC: 22 U/L (ref 10–40)
BASOPHILS # BLD AUTO: 0.06 K/UL (ref 0–0.2)
BASOPHILS NFR BLD: 1 % (ref 0–1.9)
BILIRUB SERPL-MCNC: 0.3 MG/DL (ref 0.1–1)
BUN SERPL-MCNC: 17 MG/DL (ref 6–20)
CALCIUM SERPL-MCNC: 9.1 MG/DL (ref 8.7–10.5)
CHLORIDE SERPL-SCNC: 107 MMOL/L (ref 95–110)
CHOLEST SERPL-MCNC: 192 MG/DL (ref 120–199)
CHOLEST/HDLC SERPL: 2.8 {RATIO} (ref 2–5)
CO2 SERPL-SCNC: 28 MMOL/L (ref 23–29)
CREAT SERPL-MCNC: 0.8 MG/DL (ref 0.5–1.4)
DIFFERENTIAL METHOD: ABNORMAL
EOSINOPHIL # BLD AUTO: 0.2 K/UL (ref 0–0.5)
EOSINOPHIL NFR BLD: 3.5 % (ref 0–8)
ERYTHROCYTE [DISTWIDTH] IN BLOOD BY AUTOMATED COUNT: 12.6 % (ref 11.5–14.5)
EST. GFR  (AFRICAN AMERICAN): >60 ML/MIN/1.73 M^2
EST. GFR  (NON AFRICAN AMERICAN): >60 ML/MIN/1.73 M^2
GLUCOSE SERPL-MCNC: 92 MG/DL (ref 70–110)
HCT VFR BLD AUTO: 42.9 % (ref 37–48.5)
HDLC SERPL-MCNC: 68 MG/DL (ref 40–75)
HDLC SERPL: 35.4 % (ref 20–50)
HGB BLD-MCNC: 13.7 G/DL (ref 12–16)
IMM GRANULOCYTES # BLD AUTO: 0.01 K/UL (ref 0–0.04)
IMM GRANULOCYTES NFR BLD AUTO: 0.2 % (ref 0–0.5)
LDLC SERPL CALC-MCNC: 112.8 MG/DL (ref 63–159)
LYMPHOCYTES # BLD AUTO: 2.2 K/UL (ref 1–4.8)
LYMPHOCYTES NFR BLD: 38.8 % (ref 18–48)
MCH RBC QN AUTO: 30.4 PG (ref 27–31)
MCHC RBC AUTO-ENTMCNC: 31.9 G/DL (ref 32–36)
MCV RBC AUTO: 95 FL (ref 82–98)
MONOCYTES # BLD AUTO: 0.7 K/UL (ref 0.3–1)
MONOCYTES NFR BLD: 11.5 % (ref 4–15)
NEUTROPHILS # BLD AUTO: 2.6 K/UL (ref 1.8–7.7)
NEUTROPHILS NFR BLD: 45 % (ref 38–73)
NONHDLC SERPL-MCNC: 124 MG/DL
NRBC BLD-RTO: 0 /100 WBC
PLATELET # BLD AUTO: 229 K/UL (ref 150–350)
PMV BLD AUTO: 11.2 FL (ref 9.2–12.9)
POTASSIUM SERPL-SCNC: 4.6 MMOL/L (ref 3.5–5.1)
PROT SERPL-MCNC: 6.9 G/DL (ref 6–8.4)
RBC # BLD AUTO: 4.51 M/UL (ref 4–5.4)
SODIUM SERPL-SCNC: 139 MMOL/L (ref 136–145)
TRIGL SERPL-MCNC: 56 MG/DL (ref 30–150)
TSH SERPL DL<=0.005 MIU/L-ACNC: 1.63 UIU/ML (ref 0.4–4)
WBC # BLD AUTO: 5.75 K/UL (ref 3.9–12.7)

## 2019-12-19 PROCEDURE — 84443 ASSAY THYROID STIM HORMONE: CPT

## 2019-12-19 PROCEDURE — 80053 COMPREHEN METABOLIC PANEL: CPT

## 2019-12-19 PROCEDURE — 36415 COLL VENOUS BLD VENIPUNCTURE: CPT | Mod: PO

## 2019-12-19 PROCEDURE — 85025 COMPLETE CBC W/AUTO DIFF WBC: CPT

## 2019-12-19 PROCEDURE — 80061 LIPID PANEL: CPT

## 2019-12-20 ENCOUNTER — PATIENT MESSAGE (OUTPATIENT)
Dept: FAMILY MEDICINE | Facility: CLINIC | Age: 39
End: 2019-12-20

## 2019-12-26 ENCOUNTER — PATIENT MESSAGE (OUTPATIENT)
Dept: FAMILY MEDICINE | Facility: CLINIC | Age: 39
End: 2019-12-26

## 2019-12-26 NOTE — TELEPHONE ENCOUNTER
Labs acceptable.  Patient was suppose to have apt with Sonia to go over labs.  She can be scheduled for a discussion.  Make sure she has an apt with me in April/May of 2020 and pend the same set of labs to me to be done 1 week prior.

## 2019-12-30 ENCOUNTER — PATIENT MESSAGE (OUTPATIENT)
Dept: ENDOCRINOLOGY | Facility: CLINIC | Age: 39
End: 2019-12-30

## 2020-01-02 ENCOUNTER — OFFICE VISIT (OUTPATIENT)
Dept: ENDOCRINOLOGY | Facility: CLINIC | Age: 40
End: 2020-01-02
Payer: COMMERCIAL

## 2020-01-02 VITALS
RESPIRATION RATE: 18 BRPM | SYSTOLIC BLOOD PRESSURE: 106 MMHG | HEART RATE: 64 BPM | WEIGHT: 242.19 LBS | BODY MASS INDEX: 36.71 KG/M2 | DIASTOLIC BLOOD PRESSURE: 84 MMHG | HEIGHT: 68 IN

## 2020-01-02 DIAGNOSIS — E55.9 VITAMIN D DEFICIENCY: ICD-10-CM

## 2020-01-02 DIAGNOSIS — E03.8 HYPOTHYROIDISM DUE TO HASHIMOTO'S THYROIDITIS: Primary | ICD-10-CM

## 2020-01-02 DIAGNOSIS — E04.9 GOITER, NON-TOXIC: ICD-10-CM

## 2020-01-02 DIAGNOSIS — R53.82 CHRONIC FATIGUE: ICD-10-CM

## 2020-01-02 DIAGNOSIS — E06.3 HYPOTHYROIDISM DUE TO HASHIMOTO'S THYROIDITIS: Primary | ICD-10-CM

## 2020-01-02 PROCEDURE — 99999 PR PBB SHADOW E&M-EST. PATIENT-LVL III: CPT | Mod: PBBFAC,,, | Performed by: INTERNAL MEDICINE

## 2020-01-02 PROCEDURE — 99999 PR PBB SHADOW E&M-EST. PATIENT-LVL III: ICD-10-PCS | Mod: PBBFAC,,, | Performed by: INTERNAL MEDICINE

## 2020-01-02 PROCEDURE — 99204 OFFICE O/P NEW MOD 45 MIN: CPT | Mod: S$GLB,,, | Performed by: INTERNAL MEDICINE

## 2020-01-02 PROCEDURE — 3008F PR BODY MASS INDEX (BMI) DOCUMENTED: ICD-10-PCS | Mod: CPTII,S$GLB,, | Performed by: INTERNAL MEDICINE

## 2020-01-02 PROCEDURE — 3008F BODY MASS INDEX DOCD: CPT | Mod: CPTII,S$GLB,, | Performed by: INTERNAL MEDICINE

## 2020-01-02 PROCEDURE — 99204 PR OFFICE/OUTPT VISIT, NEW, LEVL IV, 45-59 MIN: ICD-10-PCS | Mod: S$GLB,,, | Performed by: INTERNAL MEDICINE

## 2020-01-02 RX ORDER — LEVOTHYROXINE SODIUM 75 UG/1
75 TABLET ORAL DAILY
Qty: 30 TABLET | Refills: 11 | Status: SHIPPED | OUTPATIENT
Start: 2020-01-02 | End: 2020-04-01 | Stop reason: SDUPTHER

## 2020-01-02 NOTE — PROGRESS NOTES
Subjective:    Patient ID:  Jimena Lezama is a 39 y.o. female.    Chief Complaint:  Hypothyroidism      Pt presents to establish care for hypothyroidism. Pt was previously seen by nikhil Boyle last in Jan 2015 but is new to me.    Onset of Hashimoto's was in 2008. Notes symptoms occurred after having kids. Had 4 kids in 5 years, and last had twins. Takes Levothyroxine 75 mcg daily for the last 10 years. Notes history of anxiety and has been taking Celexa. 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 Notes brain fog, dry scalp,and dry skin. Has restless legs at night. Works out 5 days a week and sees a nutritionist. Despite working out she feels she is unable to loose weight. Takes supplements magnesium, glucosamine/chronditin, probiotics, vitamin D, biotin, and collagen. Notes low BP and low heart rate. She has cut back on gluten and notes more energy and less bloating. Notes mom has celiac and sister has gluten sensitivity. No FH of hypothyroidism.  Denies voice changes, hoarseness, dysphagia, or history of head/neck radiation. Had a thyroid US 10 years ago that showed goiter but no nodules.       Review of Systems   Constitution: Positive for diaphoresis and malaise/fatigue. Negative for decreased appetite, night sweats and weight loss.   HENT: Negative for hoarse voice, odynophagia and sore throat.    Eyes: Negative for blurred vision and double vision.   Cardiovascular: Negative for chest pain, dyspnea on exertion, irregular heartbeat, leg swelling, near-syncope and palpitations.   Respiratory: Negative for snoring.    Endocrine: Positive for heat intolerance. Negative for cold intolerance.   Hematologic/Lymphatic: Negative for adenopathy. Does not bruise/bleed easily.   Skin: Negative for dry skin, nail changes and rash.        Notes hair is thin and fine     Musculoskeletal: Positive for muscle cramps. Negative for myalgias.   Gastrointestinal: Negative for abdominal pain, constipation, diarrhea, nausea and  vomiting.   Neurological: Positive for difficulty with concentration and headaches. Negative for dizziness, light-headedness, numbness and tremors.   Psychiatric/Behavioral: Negative for depression. The patient does not have insomnia and is not nervous/anxious.         Past Medical History:   Diagnosis Date    Anxiety     Hypothyroid     Irritable bowel     Obesity       Social History     Tobacco Use    Smoking status: Never Smoker    Smokeless tobacco: Never Used   Substance Use Topics    Alcohol use: Yes     Frequency: 2-3 times a week     Comment: social    Drug use: No     Family History   Problem Relation Age of Onset    Cancer Mother 54        breast cancer; aggresive    Celiac disease Mother     Allergies Brother         gluten intol    Allergies Maternal Aunt         gluten intol    Celiac disease Sister     Thyroid cancer Neg Hx     Diabetes Neg Hx     Allergic rhinitis Neg Hx     Angioedema Neg Hx     Asthma Neg Hx     Eczema Neg Hx     Immunodeficiency Neg Hx     Urticaria Neg Hx     Rhinitis Neg Hx       Past Surgical History:   Procedure Laterality Date     SECTION, CLASSIC      ORIF FOOT FRACTURE      TUBAL LIGATION            Current Outpatient Medications:     citalopram (CELEXA) 20 MG tablet, TAKE 1 TABLET(20 MG) BY MOUTH EVERY DAY, Disp: 90 tablet, Rfl: 1    (Magic mouthwash) 1:1:1 Benadryl 12.5mg/5ml liq, aluminum & magnesium hydroxide-simehticone (Maalox), lidocaine viscous 2%, 5-10 ml gargle and swallow every 4 hours as needed, Disp: 450 mL, Rfl: 0    SYNTHROID 75 mcg tablet, Take 1 tablet (75 mcg total) by mouth once daily., Disp: 30 tablet, Rfl: 11     Review of patient's allergies indicates:  No Known Allergies     Objective:     Vitals:    20 1403   BP: 106/84   Pulse: 64   Resp: 18        Physical Exam   Constitutional: She is oriented to person, place, and time. She appears well-developed and well-nourished.   HENT:   Head: Normocephalic and  atraumatic.   Mouth/Throat: Oropharynx is clear and moist. Normal dentition.   Eyes: Conjunctivae are normal. No scleral icterus.   Neck: No tracheal deviation present. Thyromegaly present.   No palpable thyroid nodules   Cardiovascular: Normal rate, regular rhythm and normal heart sounds. Exam reveals no gallop and no friction rub.   No murmur heard.  Pulmonary/Chest: No respiratory distress. She has no wheezes.   Abdominal: Soft. Bowel sounds are normal. She exhibits no distension. There is no tenderness.   Musculoskeletal: She exhibits no edema.   Lymphadenopathy:     She has no cervical adenopathy.   Neurological: She is alert and oriented to person, place, and time. She displays normal reflexes.   No tremor   Skin: Skin is warm and dry. She is not diaphoretic.   Psychiatric: She has a normal mood and affect. Thought content normal.         Lab Results   Component Value Date     12/19/2019    K 4.6 12/19/2019     12/19/2019    CO2 28 12/19/2019    BUN 17 12/19/2019    CREATININE 0.8 12/19/2019    GLU 92 12/19/2019    HGBA1C 4.8 01/27/2015    AST 22 12/19/2019    ALT 16 12/19/2019    ALBUMIN 3.9 12/19/2019    PROT 6.9 12/19/2019    BILITOT 0.3 12/19/2019    WBC 5.75 12/19/2019    HGB 13.7 12/19/2019    HCT 42.9 12/19/2019    MCV 95 12/19/2019    MCH 30.4 12/19/2019     12/19/2019    MPV 11.2 12/19/2019    GRAN 2.6 12/19/2019    GRAN 45.0 12/19/2019    LYMPH 2.2 12/19/2019    LYMPH 38.8 12/19/2019    CHOL 192 12/19/2019    HDL 68 12/19/2019    LDLCALC 112.8 12/19/2019    TRIG 56 12/19/2019       Lab Results   Component Value Date    TSH 1.626 12/19/2019    [id number] 4.8 05/29/2017    FREET4 0.91 11/08/2016        Thyroid Labs Latest Ref Rng & Units 7/12/2018 4/24/2019 12/19/2019   TSH 0.400 - 4.000 uIU/mL 1.702 1.271 1.626   Free T4 0.71 - 1.51 ng/dL - - -   Sodium 136 - 145 mmol/L 139 - 139   Potassium 3.5 - 5.1 mmol/L 4.2 - 4.6   Chloride 95 - 110 mmol/L 107 - 107   Carbon Dioxide 23 - 29 mmol/L  25 - 28   Glucose 70 - 110 mg/dL 100 - 92   Blood Urea Nitrogen 6 - 20 mg/dL 12 - 17   Creatinine 0.5 - 1.4 mg/dL 0.8 - 0.8   Calcium 8.7 - 10.5 mg/dL 9.5 - 9.1   Total Protein 6.0 - 8.4 g/dL 7.4 - 6.9   Albumin 3.5 - 5.2 g/dL 4.3 - 3.9   Total Bilirubin 0.1 - 1.0 mg/dL 0.4 - 0.3   AST 10 - 40 U/L 22 - 22   ALT 10 - 44 U/L 18 - 16   Anion Gap 8 - 16 mmol/L 7(L) - 4(L)   eGFR (African American) >60 mL/min/1.73 m:2 >60.0 - >60.0   eGFR (Non-African American) >60 mL/min/1.73 m:2 >60.0 - >60.0   WBC 3.90 - 12.70 K/uL - - 5.75   RBC 4.00 - 5.40 M/uL - - 4.51   Hemoglobin 12.0 - 16.0 g/dL - - 13.7   Hematocrit 37.0 - 48.5 % - - 42.9   MCV 82 - 98 fL - - 95   MCH 27.0 - 31.0 pg - - 30.4   MCHC 32.0 - 36.0 g/dL - - 31.9(L)   RDW 11.5 - 14.5 % - - 12.6   Platelets 150 - 350 K/uL - - 229   MPV 9.2 - 12.9 fL - - 11.2   Gran # 1.8 - 7.7 K/uL - - 2.6   Lymph # 1.0 - 4.8 K/uL - - 2.2   Mono # 0.3 - 1.0 K/uL - - 0.7   Eos # 0.0 - 0.5 K/uL - - 0.2   Baso # 0.00 - 0.20 K/uL - - 0.06   Gran % 38.0 - 73.0 % - - 45.0   Lymph % 18.0 - 48.0 % - - 38.8   Mono% 4.0 - 15.0 % - - 11.5   Eos % 0.0 - 8.0 % - - 3.5   Baso % 0.0 - 1.9 % - - 1.0   Thyroperoxidase Antibodies 0 - 6 IU/mL - - -           Hemoglobin A1C   Date Value Ref Range Status   01/27/2015 4.8 4.5 - 6.2 % Final         Assessment:       1. Hypothyroidism due to Hashimoto's thyroiditis    2. Goiter, non-toxic    3. Chronic fatigue    4. Vitamin D deficiency         Plan:   Hypothyroidism due to Hashimoto's thyroiditis, chronic, stable  Overall pt clinically euthyroid with some complaints consistent with hypothyroidism. She has been on Biotin but thyroid function test have been in the nl range and not elevated. Will switch to branded Synthroid to see if perception of chronic fatigue improves.  Most recent TSH 1.62 wnls.  Continue 75 mcg dose.    Goiter, chronic, stable  Pt with hx of goiter and thyroid ultrasound in the past with no nodules.   Continue to monitor.   Recheck  thyroid US if pt becomes symptomatic.     Chronic fatigue, progressive  Discussed with pt that symptoms are likely multifactorial. 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 Also notes hx of low baseline HR and BP. Will check 8 am cortisol and ACTH and do stim test if indicated.   Encouraged pt to continue with healthy diet and exercise.     Vitamin D deficiency, chronic, stable  Pt currently on supplements. Check repeat vitamin D level.    Follow up:  RTC in 6 months

## 2020-01-02 NOTE — LETTER
January 2, 2020      Jeffry Helms MD  [address] [facility] [address] [place] [address]           [facility]  [address] [place] [address]  Phone: [phone]  Fax: [phone]          Patient: Jimena Lezama   MR Number: [id number]   YOB: 1980   Date of Visit: 1/2/2020       Dear Dr. Jeffry Helms:    Thank you for referring Jimena Lezama to me for evaluation. Attached you will find relevant portions of my assessment and plan of care.    If you have questions, please do not hesitate to call me. I look forward to following Jimena Lezama along with you.    Sincerely,    Juliette L. Sandifer Kum-Nji, MD    Enclosure  CC:  No Recipients    If you would like to receive this communication electronically, please contact [email or web address] or [phone] to request more information on [facility] [employer] access.    For providers and/or their staff who would like to refer a patient to [facility], please contact us through our one-stop-shop provider referral line, [facility], at [phone].    If you feel you have received this communication in error or would no longer like to receive these types of communications, please e-mail [email or web address]

## 2020-01-03 ENCOUNTER — PATIENT MESSAGE (OUTPATIENT)
Dept: ENDOCRINOLOGY | Facility: CLINIC | Age: 40
End: 2020-01-03

## 2020-04-01 DIAGNOSIS — E06.3 HYPOTHYROIDISM DUE TO HASHIMOTO'S THYROIDITIS: ICD-10-CM

## 2020-04-01 DIAGNOSIS — E03.8 HYPOTHYROIDISM DUE TO HASHIMOTO'S THYROIDITIS: ICD-10-CM

## 2020-04-04 NOTE — PROGRESS NOTES
Refill Authorization Note     is requesting a refill authorization.    Brief assessment and rationale for refill: APPROVE: prr          Medication Therapy Plan: Needs appt(Annual); Appts will be suspended at this time                              Comments:     Refill Center Care Gap Closure protocols temporarily suspended.   Requested Prescriptions   Pending Prescriptions Disp Refills    SYNTHROID 75 mcg tablet 90 tablet 0     Sig: Take 1 tablet (75 mcg total) by mouth once daily.       Endocrinology:  Hypothyroid Agents Failed - 4/1/2020  8:44 AM        Failed - Manual Review: FT4 is not required if last TSH is WNL.        Failed - T4 free within 1080 days     Free T4   Date Value Ref Range Status   11/08/2016 0.91 0.71 - 1.51 ng/dL Final   04/20/2011 0.76 0.71 - 1.51 ng/dl Final   04/06/2011 0.75 0.71 - 1.51 ng/dl Final              Passed - Patient is at least 18 years old        Passed - Negative Pregnancy Status Check        Passed - Office visit in past 12 months or future 90 days.     Recent Outpatient Visits            3 months ago Hypothyroidism due to Hashimoto's thyroiditis    [facility] Juliette L. Sandifer Kum-Nji, MD    11 months ago Routine physical examination    [facility] Jeffry Helms MD    1 year ago Pharyngitis, unspecified etiology    [facility] Binta Bah NP    1 year ago Class 3 obesity without serious comorbidity with body mass index (BMI) of 40.0 to 44.9 in adult, unspecified obesity type    [facility] Sonia Perez NP    2 years ago Hypothyroidism due to acquired atrophy of thyroid    [facility] Sonia Perez NP          Future Appointments              In 3 months Juliette L. Sandifer Kum-Nji, MD [facility]                Passed - TSH in normal range and within 360 days     TSH   Date Value Ref Range Status   12/19/2019 1.626 0.400 - 4.000 uIU/mL  Final   04/24/2019 1.271 0.400 - 4.000 uIU/mL Final   07/12/2018 1.702 0.400 - 4.000 uIU/mL Final               Appointments  past 12m or future 3m with PCP    Date Provider   Last Visit   4/24/2019 Jeffry Helms MD   Next Visit   Visit date not found Jeffry Helms MD   .  ED visits in past 90 days: 0       Note composed:11:08 PM 04/03/2020

## 2020-04-06 RX ORDER — LEVOTHYROXINE SODIUM 75 UG/1
75 TABLET ORAL DAILY
Qty: 90 TABLET | Refills: 0 | Status: SHIPPED | OUTPATIENT
Start: 2020-04-06 | End: 2020-05-13

## 2020-05-13 DIAGNOSIS — E06.3 HYPOTHYROIDISM DUE TO HASHIMOTO'S THYROIDITIS: ICD-10-CM

## 2020-05-13 DIAGNOSIS — E03.8 HYPOTHYROIDISM DUE TO HASHIMOTO'S THYROIDITIS: ICD-10-CM

## 2020-05-13 RX ORDER — LEVOTHYROXINE SODIUM 75 UG/1
TABLET ORAL
Qty: 90 TABLET | Refills: 0 | Status: SHIPPED | OUTPATIENT
Start: 2020-05-13 | End: 2020-06-29 | Stop reason: SDUPTHER

## 2020-06-26 DIAGNOSIS — Z12.39 BREAST CANCER SCREENING: ICD-10-CM

## 2020-06-29 ENCOUNTER — PATIENT MESSAGE (OUTPATIENT)
Dept: ENDOCRINOLOGY | Facility: CLINIC | Age: 40
End: 2020-06-29

## 2020-06-29 ENCOUNTER — OFFICE VISIT (OUTPATIENT)
Dept: URGENT CARE | Facility: CLINIC | Age: 40
End: 2020-06-29
Payer: COMMERCIAL

## 2020-06-29 VITALS
HEART RATE: 80 BPM | HEIGHT: 68 IN | BODY MASS INDEX: 37.13 KG/M2 | RESPIRATION RATE: 18 BRPM | OXYGEN SATURATION: 100 % | WEIGHT: 245 LBS | TEMPERATURE: 97 F

## 2020-06-29 DIAGNOSIS — J32.9 SINUSITIS, UNSPECIFIED CHRONICITY, UNSPECIFIED LOCATION: Primary | ICD-10-CM

## 2020-06-29 DIAGNOSIS — R53.83 FATIGUE, UNSPECIFIED TYPE: ICD-10-CM

## 2020-06-29 PROCEDURE — 99214 OFFICE O/P EST MOD 30 MIN: CPT | Mod: S$GLB,,, | Performed by: PHYSICIAN ASSISTANT

## 2020-06-29 PROCEDURE — U0003 INFECTIOUS AGENT DETECTION BY NUCLEIC ACID (DNA OR RNA); SEVERE ACUTE RESPIRATORY SYNDROME CORONAVIRUS 2 (SARS-COV-2) (CORONAVIRUS DISEASE [COVID-19]), AMPLIFIED PROBE TECHNIQUE, MAKING USE OF HIGH THROUGHPUT TECHNOLOGIES AS DESCRIBED BY CMS-2020-01-R: HCPCS

## 2020-06-29 PROCEDURE — 99214 PR OFFICE/OUTPT VISIT, EST, LEVL IV, 30-39 MIN: ICD-10-PCS | Mod: S$GLB,,, | Performed by: PHYSICIAN ASSISTANT

## 2020-06-30 NOTE — PROGRESS NOTES
"Subjective:       Patient ID: Jimena Lezama is a 39 y.o. female.    Vitals:  height is 5' 8" (1.727 m) and weight is 111.1 kg (245 lb). Her temperature is 97.2 °F (36.2 °C). Her pulse is 80. Her respiration is 18 and oxygen saturation is 100%.     Chief Complaint: URI    Pt has sinus issues for 6 days. Pt has congestion, sneezing, and post nasal drip. Afebrile. Denies chest pain, SOB, cough, myalgias or GI upset. Patient states that she has felt low energy.    URI   This is a new problem. The current episode started in the past 7 days. There has been no fever. Associated symptoms include congestion, headaches, sinus pain and sneezing. Pertinent negatives include no chest pain, coughing, diarrhea, dysuria, nausea, rash, sore throat or vomiting. She has tried NSAIDs and acetaminophen for the symptoms. The treatment provided no relief.       Constitution: Positive for fatigue. Negative for chills and fever.   HENT: Positive for congestion, sinus pain and sinus pressure. Negative for sore throat.    Neck: Negative for painful lymph nodes.   Cardiovascular: Negative for chest pain and leg swelling.   Eyes: Negative for double vision and blurred vision.   Respiratory: Negative for cough and shortness of breath.    Gastrointestinal: Negative for nausea, vomiting and diarrhea.   Genitourinary: Negative for dysuria, frequency, urgency and history of kidney stones.   Musculoskeletal: Negative for joint pain, joint swelling, muscle cramps and muscle ache.   Skin: Negative for color change, pale, rash and bruising.   Allergic/Immunologic: Positive for sneezing. Negative for seasonal allergies.   Neurological: Positive for headaches. Negative for dizziness, history of vertigo, light-headedness and passing out.   Hematologic/Lymphatic: Negative for swollen lymph nodes.   Psychiatric/Behavioral: Negative for nervous/anxious, sleep disturbance and depression. The patient is not nervous/anxious.        Objective:    "   Physical Exam   Constitutional: She is oriented to person, place, and time. She appears well-developed. She is cooperative.  Non-toxic appearance. She does not appear ill. No distress.   HENT:   Head: Normocephalic and atraumatic.   Right Ear: Hearing, external ear and ear canal normal. Tympanic membrane is not erythematous and not bulging. A middle ear effusion is present.   Left Ear: Hearing, tympanic membrane, external ear and ear canal normal. Tympanic membrane is not erythematous and not bulging.  No middle ear effusion.   Nose: Rhinorrhea and congestion present. No mucosal edema or nasal deformity. No epistaxis. Right sinus exhibits no maxillary sinus tenderness and no frontal sinus tenderness. Left sinus exhibits no maxillary sinus tenderness and no frontal sinus tenderness.   Mouth/Throat: Uvula is midline, oropharynx is clear and moist and mucous membranes are normal. No trismus in the jaw. Normal dentition. No uvula swelling. Cobblestoning present. No oropharyngeal exudate, posterior oropharyngeal edema, posterior oropharyngeal erythema or tonsillar abscesses. Tonsils are 0 on the right. Tonsils are 0 on the left. No tonsillar exudate.   Eyes: Conjunctivae and lids are normal. No scleral icterus.   Neck: Trachea normal, full passive range of motion without pain and phonation normal. Neck supple. No neck rigidity. No edema and no erythema present.   Cardiovascular: Normal rate, regular rhythm, normal heart sounds and normal pulses. Exam reveals no gallop and no friction rub.   No murmur heard.  Pulmonary/Chest: Effort normal and breath sounds normal. No stridor. No respiratory distress. She has no decreased breath sounds. She has no wheezes. She has no rhonchi. She has no rales.   Abdominal: Normal appearance.   Musculoskeletal: Normal range of motion.         General: No deformity.   Lymphadenopathy:        Head (right side): No submandibular and no tonsillar adenopathy present.        Head (left side):  No submandibular and no tonsillar adenopathy present.     She has no cervical adenopathy.        Right cervical: No superficial cervical and no posterior cervical adenopathy present.       Left cervical: No superficial cervical and no posterior cervical adenopathy present.   Neurological: She is alert and oriented to person, place, and time. She exhibits normal muscle tone. Coordination normal.   Skin: Skin is warm, dry, intact, not diaphoretic and not pale.   Psychiatric: Her speech is normal and behavior is normal. Judgment and thought content normal.   Nursing note and vitals reviewed.        Assessment:       1. Sinusitis, unspecified chronicity, unspecified location    2. Fatigue, unspecified type        Plan:       Discussed that at this time that it is hard to delineate whether her symptoms are allergic or viral. Discussed that she can benefit from Flonase and Zyrtec for symptomatic relief of her PND and sinus drainage. Discussed that Flonase is 1 spray in each nostrill daily with maximum relief after 1 week of consistent use. Discussed that she should take Zyrtec daily to dry up sinuses. Her cough is likely stimulated by her uncontrolled sinus drainage.     Discussed with patient that I cannot definitively rule out COVID-19 at this time. Discussed that the patient does meet criteria for outpatient testing per CDC/[facility]'s current guidelines. Advised if patient developed new symptoms to return for re-evaluation. Advised if the patient developed difficulty breathing or shortness of breath, go to the ED immediately. Stressed importance of good hand hygiene and social distancing. Patient voiced understanding.     Rest and fluids are important.  Please take tylenol for help with fever, pain.  Mucinex can help with chest congestion.  Tessalon perrles can be used as directed as needed to help with cough.  Albuterol inhaler can be used as directed needed for wheezing as we have discussed.    If you develop  worsening symptoms, especially shortness of breath or difficulty breathing, please go to the ED for further evaluation.    Sinusitis, unspecified chronicity, unspecified location    Fatigue, unspecified type  -     COVID-19 Routine Screening      Patient Instructions   Instructions for Patients with Confirmed or Suspected COVID-19    If you are awaiting your test result, you will either be called or it will be released to the patient portal.  If you have any questions about your test, please visit [email or web address] or call our COVID-19 information line at [phone].      Preventing the Spread of Coronavirus Disease 2019 (COVID-19) in Homes and Residential Communities -- Patients     Prevention steps for people with confirmed or suspected COVID-19 (including persons under investigation) who do not need to be hospitalized and people with confirmed COVID-19 who were hospitalized and determined to be medically stable to go home.      Stay home except to get medical care.    Separate yourself from other people and animals in your home.    Call ahead before visiting your doctor.    Wear a face mask.    Cover your coughs and sneezes.    Clean your hands often.    Avoid sharing personal household items.    Clean all high-touch surfaces every day.    Monitor your symptoms. Seek prompt medical attention if your illness is worsening (e.g., difficulty breathing). Before seeking care, call your healthcare provider.    If you have a medical emergency and must call 911, notify the dispatcher that you have or are being evaluated for COVID-19. If possible, put on a face mask before emergency medical services arrive.    Use the following symptom-based strategy to return to normal activity following a suspected or confirmed case of COVID-19. Continue isolation until:   o At least 3 days (72 hours) have passed since recovery defined as resolution of fever without the use of fever-reducing medications and  improvement in respiratory symptoms (e.g. cough, shortness of breath), and   o At least 10 days have passed since symptoms first appeared.     Precautions for household members, intimate partners and caregivers in a non-healthcare setting of a patient with symptomatic laboratory-confirmed COVID-19 or a patient under investigation.     Household members, intimate partners and caregivers in a non-healthcare setting may have close contact with a person with symptomatic, laboratory-confirmed COVID-19 or a person under investigation. Close contacts should monitor their health; they should call their healthcare provider right away if they develop symptoms suggestive of COVID-19 (e.g., fever, cough, shortness of breath). Close contacts should also follow these recommendations:     · Stay home for the duration of the time recommended by healthcare provider, except to get medical care. Separate yourself from other people and animals in the home.  · Monitor the patients symptoms. If the patient is getting sicker, call his or her healthcare provider. If the patient has a medical emergency and you need to call 911, notify the dispatch personnel that the patient has or is being evaluated for COVID-19.   · Wear a facemask when around other people such as sharing a room or vehicle and before entering a healthcare provider's office.  · Cover coughs and sneezes with a tissue. Throw used tissues in a lined trash can immediately and wash hands.  · Clean hands often with soap and water for at least 20 seconds or with an alcohol-based hand , rubbing hands together until they feel dry. Avoid touching your eyes, nose, and mouth with unwashed hands.  · Clean all high-touch; surfaces every day, including counters, tabletops, doorknobs, bathroom fixtures, toilets, phones, keyboards, tablets, bedside tables, etc. Use a household cleaning spray or wipe according to label instructions.  · Avoid sharing personal household items such  as dishes, drinking glasses, cups, towels, bedding, etc. After these items are used, they should be washed thoroughly with soap and water.  · Use the following symptom-based strategy to return to normal activity following a suspected or confirmed case of COVID-19. Continue isolation until:   · At least 3 days (72 hours) have passed since recovery defined as resolution of fever without the use of fever-reducing medications and improvement in respiratory symptoms (e.g. cough, shortness of breath), and   · At least 10 days have passed since symptoms first appeared.      Rest and fluids are important.  Please take tylenol for help with fever, pain.  Mucinex can help with chest congestion.  Tessalon perrles can be used as directed as needed to help with cough.  Albuterol inhaler can be used as directed needed for wheezing as we have discussed.    If you develop worsening symptoms, especially shortness of breath or difficulty breathing, please go to the ED for further evaluation.    Please follow up with your Primary care provider within 2-5 days if your signs and symptoms have not resolved or worsen.     If your condition worsens or fails to improve we recommend that you receive another evaluation at the emergency room immediately or contact your primary medical clinic to discuss your concerns.   You must understand that you have received an Urgent Care treatment only and that you may be released before all of your medical problems are known or treated. You, the patient, will arrange for follow up care as instructed.     RED FLAGS/WARNING SYMPTOMS DISCUSSED WITH PATIENT THAT WOULD WARRANT EMERGENT MEDICAL ATTENTION. PATIENT VERBALIZED UNDERSTANDING.

## 2020-06-30 NOTE — PATIENT INSTRUCTIONS
Instructions for Patients with Confirmed or Suspected COVID-19    If you are awaiting your test result, you will either be called or it will be released to the patient portal.  If you have any questions about your test, please visit [email or web address] or call our COVID-19 information line at [phone].      Preventing the Spread of Coronavirus Disease 2019 (COVID-19) in Homes and Residential Communities -- Patients     Prevention steps for people with confirmed or suspected COVID-19 (including persons under investigation) who do not need to be hospitalized and people with confirmed COVID-19 who were hospitalized and determined to be medically stable to go home.      Stay home except to get medical care.    Separate yourself from other people and animals in your home.    Call ahead before visiting your doctor.    Wear a face mask.    Cover your coughs and sneezes.    Clean your hands often.    Avoid sharing personal household items.    Clean all high-touch surfaces every day.    Monitor your symptoms. Seek prompt medical attention if your illness is worsening (e.g., difficulty breathing). Before seeking care, call your healthcare provider.    If you have a medical emergency and must call 911, notify the dispatcher that you have or are being evaluated for COVID-19. If possible, put on a face mask before emergency medical services arrive.    Use the following symptom-based strategy to return to normal activity following a suspected or confirmed case of COVID-19. Continue isolation until:   o At least 3 days (72 hours) have passed since recovery defined as resolution of fever without the use of fever-reducing medications and improvement in respiratory symptoms (e.g. cough, shortness of breath), and   o At least 10 days have passed since symptoms first appeared.     Precautions for household members, intimate partners and caregivers in a non-healthcare setting of a patient with symptomatic  laboratory-confirmed COVID-19 or a patient under investigation.     Household members, intimate partners and caregivers in a non-healthcare setting may have close contact with a person with symptomatic, laboratory-confirmed COVID-19 or a person under investigation. Close contacts should monitor their health; they should call their healthcare provider right away if they develop symptoms suggestive of COVID-19 (e.g., fever, cough, shortness of breath). Close contacts should also follow these recommendations:     · Stay home for the duration of the time recommended by healthcare provider, except to get medical care. Separate yourself from other people and animals in the home.  · Monitor the patients symptoms. If the patient is getting sicker, call his or her healthcare provider. If the patient has a medical emergency and you need to call 911, notify the dispatch personnel that the patient has or is being evaluated for COVID-19.   · Wear a facemask when around other people such as sharing a room or vehicle and before entering a healthcare provider's office.  · Cover coughs and sneezes with a tissue. Throw used tissues in a lined trash can immediately and wash hands.  · Clean hands often with soap and water for at least 20 seconds or with an alcohol-based hand , rubbing hands together until they feel dry. Avoid touching your eyes, nose, and mouth with unwashed hands.  · Clean all high-touch; surfaces every day, including counters, tabletops, doorknobs, bathroom fixtures, toilets, phones, keyboards, tablets, bedside tables, etc. Use a household cleaning spray or wipe according to label instructions.  · Avoid sharing personal household items such as dishes, drinking glasses, cups, towels, bedding, etc. After these items are used, they should be washed thoroughly with soap and water.  · Use the following symptom-based strategy to return to normal activity following a suspected or confirmed case of COVID-19.  Continue isolation until:   · At least 3 days (72 hours) have passed since recovery defined as resolution of fever without the use of fever-reducing medications and improvement in respiratory symptoms (e.g. cough, shortness of breath), and   · At least 10 days have passed since symptoms first appeared.      Rest and fluids are important.  Please take tylenol for help with fever, pain.  Mucinex can help with chest congestion.  Tessalon perrles can be used as directed as needed to help with cough.  Albuterol inhaler can be used as directed needed for wheezing as we have discussed.    If you develop worsening symptoms, especially shortness of breath or difficulty breathing, please go to the ED for further evaluation.    Please follow up with your Primary care provider within 2-5 days if your signs and symptoms have not resolved or worsen.     If your condition worsens or fails to improve we recommend that you receive another evaluation at the emergency room immediately or contact your primary medical clinic to discuss your concerns.   You must understand that you have received an Urgent Care treatment only and that you may be released before all of your medical problems are known or treated. You, the patient, will arrange for follow up care as instructed.     RED FLAGS/WARNING SYMPTOMS DISCUSSED WITH PATIENT THAT WOULD WARRANT EMERGENT MEDICAL ATTENTION. PATIENT VERBALIZED UNDERSTANDING.

## 2020-07-03 LAB — SARS-COV-2 RNA RESP QL NAA+PROBE: NEGATIVE

## 2020-07-07 ENCOUNTER — PATIENT OUTREACH (OUTPATIENT)
Dept: ADMINISTRATIVE | Facility: OTHER | Age: 40
End: 2020-07-07

## 2020-07-07 DIAGNOSIS — E03.8 HYPOTHYROIDISM DUE TO HASHIMOTO'S THYROIDITIS: ICD-10-CM

## 2020-07-07 DIAGNOSIS — E06.3 HYPOTHYROIDISM DUE TO HASHIMOTO'S THYROIDITIS: ICD-10-CM

## 2020-07-07 NOTE — PROGRESS NOTES
Requested updates within Care Everywhere.  Patient's chart was reviewed for overdue ANA LAURA topics.  Immunizations reconciled.    Orders placed:  Tasked appts:mammogram  Labs Linked:

## 2020-07-08 ENCOUNTER — TELEPHONE (OUTPATIENT)
Dept: ENDOCRINOLOGY | Facility: CLINIC | Age: 40
End: 2020-07-08

## 2020-07-08 ENCOUNTER — PATIENT OUTREACH (OUTPATIENT)
Dept: ADMINISTRATIVE | Facility: OTHER | Age: 40
End: 2020-07-08

## 2020-07-08 RX ORDER — LEVOTHYROXINE SODIUM 75 UG/1
75 TABLET ORAL
Qty: 90 TABLET | Refills: 0 | OUTPATIENT
Start: 2020-07-08

## 2020-07-08 NOTE — PROGRESS NOTES
Quick DC. Duplicate Request   Refill Authorization Note    is requesting a refill authorization.    Brief assessment and rationale for refill: QUICK DC: duplicate request                Medication reconciliation completed: No                          Comments:   Pended Medication(s)   Requested Prescriptions     Pending Prescriptions Disp Refills    levothyroxine (SYNTHROID) 75 MCG tablet 90 tablet 0     Sig: Take 1 tablet (75 mcg total) by mouth before breakfast.        Duplicate Pended Encounter(s)/ Last Prescribed Details:    levothyroxine (SYNTHROID) 75 MCG tablet 90 tablet 0 7/1/2020     Sig - Route: Take 1 tablet (75 mcg total) by mouth before breakfast. - Oral    Sent to pharmacy as: levothyroxine (SYNTHROID) 75 MCG tablet    Notes to Pharmacy: Please inactivate all prior scripts with same name and strength including any scripts on hold.    [employer]ign for Ordering: Accepted by Jeffry Helms MD on 7/2/2020  8:54 AM    Ordering Encounter Report    Associated Reports   View Encounter                Note composed:12:49 PM 07/08/2020

## 2020-07-09 ENCOUNTER — OFFICE VISIT (OUTPATIENT)
Dept: ENDOCRINOLOGY | Facility: CLINIC | Age: 40
End: 2020-07-09
Payer: COMMERCIAL

## 2020-07-09 DIAGNOSIS — E66.09 CLASS 2 OBESITY DUE TO EXCESS CALORIES WITHOUT SERIOUS COMORBIDITY WITH BODY MASS INDEX (BMI) OF 37.0 TO 37.9 IN ADULT: ICD-10-CM

## 2020-07-09 DIAGNOSIS — F32.A DEPRESSION, UNSPECIFIED DEPRESSION TYPE: ICD-10-CM

## 2020-07-09 DIAGNOSIS — E03.8 HYPOTHYROIDISM DUE TO HASHIMOTO'S THYROIDITIS: Primary | ICD-10-CM

## 2020-07-09 DIAGNOSIS — E06.3 HYPOTHYROIDISM DUE TO HASHIMOTO'S THYROIDITIS: Primary | ICD-10-CM

## 2020-07-09 DIAGNOSIS — R53.82 CHRONIC FATIGUE: ICD-10-CM

## 2020-07-09 PROCEDURE — 99214 PR OFFICE/OUTPT VISIT, EST, LEVL IV, 30-39 MIN: ICD-10-PCS | Mod: 95,,, | Performed by: INTERNAL MEDICINE

## 2020-07-09 PROCEDURE — 99214 OFFICE O/P EST MOD 30 MIN: CPT | Mod: 95,,, | Performed by: INTERNAL MEDICINE

## 2020-07-09 NOTE — ASSESSMENT & PLAN NOTE
Symptoms much improved after change in her work environment. Given this, will defer testing 8 a.m. cortisol and ACTH levels.  Thyroid hormone levels previously within normal limits.  Continue to monitor.

## 2020-07-09 NOTE — PROGRESS NOTES
Subjective:    Patient ID:  Jimena Lezama is a 39 y.o. female.    Chief Complaint:  Hypothyroidism      Pt presents to follow up for hypothyroidism.     Visit conducted over telemedicine. Start time 10:07 am. End time 10:16 am.    The patient location is: [place], [place] (home)   Visit type: audiovisual    Face to Face time with patient: 12  15  minutes of total time spent on the encounter, which includes face to face time and non-face to face time preparing to see the patient (eg, review of tests), Obtaining and/or reviewing separately obtained history, Documenting clinical information in the electronic or other health record, Independently interpreting results (not separately reported) and communicating results to the patient/family/caregiver, or Care coordination (not separately reported).     Each patient to whom he or she provides medical services by telemedicine is:  (1) informed of the relationship between the physician and patient and the respective role of any other health care provider with respect to management of the patient; and (2) notified that he or she may decline to receive medical services by telemedicine and may withdraw from such care at any time.     Overview: Onset of Hashimoto's was in 2008. Notes symptoms occurred after having kids. Had 4 kids in 5 years, and last had twins. Has been on Levothyroxine 75 mcg daily for the last 10 years. Notes history of anxiety and has been taking Celexa. 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 Notes brain fog, dry scalp, and dry skin in the past. Has restless legs at night. Works out 5 days a week and sees a nutritionist. Despite working out she feels she is unable to loose weight. Takes supplements magnesium, glucosamine/chronditin, probiotics, vitamin D, biotin, and collagen. Notes low BP and low heart rate. She has cut back on gluten and notes more energy and less bloating. Notes mom has celiac and sister has gluten sensitivity. No FH of  hypothyroidism.  Denies voice changes, hoarseness, dysphagia, or history of head/neck radiation. Had a thyroid US 10 years ago that showed goiter but no nodules.     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 Works out daily. Notes much more energy and feels well and rested. Stress level is way down. Initially thought fatigue sx's were due to her thyroid but notes stress has played a big role, and she is feeling much better already. Switched from levothyroxine to Synthroid 75 mcg since last visit as well. Takes thyroid medication properly without food first thing in the morning. Has nutritionist and is doing well with diet.               Review of Systems   Constitutional: Negative for diaphoresis, fatigue and unexpected weight change.   HENT: Negative for trouble swallowing and voice change.    Eyes: Negative for visual disturbance.   Respiratory: Negative for shortness of breath.    Cardiovascular: Negative for chest pain.   Gastrointestinal: Negative for abdominal pain, constipation, diarrhea, nausea and vomiting.   Endocrine: Negative for cold intolerance and heat intolerance.   Musculoskeletal: Negative for myalgias.   Skin: Negative for wound.   Neurological: Negative for headaches.   Hematological: Negative for adenopathy.   Psychiatric/Behavioral: Negative for sleep disturbance.        Past Medical History:   Diagnosis Date    Anxiety     Hypothyroid     Irritable bowel     Obesity       Social History     Tobacco Use    Smoking status: Never Smoker    Smokeless tobacco: Never Used   Substance Use Topics    Alcohol use: Yes     Frequency: 2-3 times a week     Comment: social    Drug use: No     Family History   Problem Relation Age of Onset    Cancer Mother 54        breast cancer; aggresive    Celiac disease Mother     Allergies Brother         gluten intol    Allergies Maternal Aunt         gluten intol    Celiac disease Sister     Thyroid cancer Neg Hx     Diabetes Neg  Hx     Allergic rhinitis Neg Hx     Angioedema Neg Hx     Asthma Neg Hx     Eczema Neg Hx     Immunodeficiency Neg Hx     Urticaria Neg Hx     Rhinitis Neg Hx       Past Surgical History:   Procedure Laterality Date     SECTION, CLASSIC      ORIF FOOT FRACTURE      TUBAL LIGATION            Current Outpatient Medications:     (Magic mouthwash) 1:1:1 Benadryl 12.5mg/5ml liq, aluminum & magnesium hydroxide-simehticone (Maalox), lidocaine viscous 2%, 5-10 ml gargle and swallow every 4 hours as needed (Patient not taking: Reported on 2020), Disp: 450 mL, Rfl: 0    citalopram (CELEXA) 20 MG tablet, TAKE 1 TABLET(20 MG) BY MOUTH EVERY DAY, Disp: 90 tablet, Rfl: 0    levothyroxine (SYNTHROID) 75 MCG tablet, Take 1 tablet (75 mcg total) by mouth before breakfast., Disp: 90 tablet, Rfl: 0     Review of patient's allergies indicates:  No Known Allergies     Objective:   There were no vitals taken for this visit.  BP Readings from Last 3 Encounters:   20 106/84   19 110/74   18 112/78     Wt Readings from Last 3 Encounters:   20 1907 111.1 kg (245 lb)   20 1403 109.8 kg (242 lb 2.8 oz)   19 1656 105 kg (231 lb 7.7 oz)          Physical Exam  Constitutional:       Appearance: She is well-developed.   HENT:      Head: Normocephalic and atraumatic.      Right Ear: External ear normal.      Left Ear: External ear normal.   Eyes:      General: No scleral icterus.  Neck:      Trachea: No tracheal deviation.   Pulmonary:      Effort: Pulmonary effort is normal.   Neurological:      Mental Status: She is alert and oriented to person, place, and time.      Cranial Nerves: No cranial nerve deficit.   Psychiatric:         Thought Content: Thought content normal.           Lab Results   Component Value Date     2019    K 4.6 2019     2019    CO2 28 2019    BUN 17 2019    CREATININE 0.8 2019    GLU 92 2019    HGBA1C 4.8  01/27/2015    AST 22 12/19/2019    ALT 16 12/19/2019    ALBUMIN 3.9 12/19/2019    PROT 6.9 12/19/2019    BILITOT 0.3 12/19/2019    WBC 5.75 12/19/2019    HGB 13.7 12/19/2019    HCT 42.9 12/19/2019    MCV 95 12/19/2019    MCH 30.4 12/19/2019     12/19/2019    MPV 11.2 12/19/2019    GRAN 2.6 12/19/2019    GRAN 45.0 12/19/2019    LYMPH 2.2 12/19/2019    LYMPH 38.8 12/19/2019    CHOL 192 12/19/2019    HDL 68 12/19/2019    LDLCALC 112.8 12/19/2019    TRIG 56 12/19/2019       Lab Results   Component Value Date    TSH 1.626 12/19/2019    X1AJMCV 4.8 05/29/2017    FREET4 0.91 11/08/2016        Thyroid Labs Latest Ref Rng & Units 7/12/2018 4/24/2019 12/19/2019   TSH 0.400 - 4.000 uIU/mL 1.702 1.271 1.626   Free T4 0.71 - 1.51 ng/dL - - -   Sodium 136 - 145 mmol/L 139 - 139   Potassium 3.5 - 5.1 mmol/L 4.2 - 4.6   Chloride 95 - 110 mmol/L 107 - 107   Carbon Dioxide 23 - 29 mmol/L 25 - 28   Glucose 70 - 110 mg/dL 100 - 92   Blood Urea Nitrogen 6 - 20 mg/dL 12 - 17   Creatinine 0.5 - 1.4 mg/dL 0.8 - 0.8   Calcium 8.7 - 10.5 mg/dL 9.5 - 9.1   Total Protein 6.0 - 8.4 g/dL 7.4 - 6.9   Albumin 3.5 - 5.2 g/dL 4.3 - 3.9   Total Bilirubin 0.1 - 1.0 mg/dL 0.4 - 0.3   AST 10 - 40 U/L 22 - 22   ALT 10 - 44 U/L 18 - 16   Anion Gap 8 - 16 mmol/L 7(L) - 4(L)   eGFR (African American) >60 mL/min/1.73 m:2 >60.0 - >60.0   eGFR (Non-African American) >60 mL/min/1.73 m:2 >60.0 - >60.0   WBC 3.90 - 12.70 K/uL - - 5.75   RBC 4.00 - 5.40 M/uL - - 4.51   Hemoglobin 12.0 - 16.0 g/dL - - 13.7   Hematocrit 37.0 - 48.5 % - - 42.9   MCV 82 - 98 fL - - 95   MCH 27.0 - 31.0 pg - - 30.4   MCHC 32.0 - 36.0 g/dL - - 31.9(L)   RDW 11.5 - 14.5 % - - 12.6   Platelets 150 - 350 K/uL - - 229   MPV 9.2 - 12.9 fL - - 11.2   Gran # 1.8 - 7.7 K/uL - - 2.6   Lymph # 1.0 - 4.8 K/uL - - 2.2   Mono # 0.3 - 1.0 K/uL - - 0.7   Eos # 0.0 - 0.5 K/uL - - 0.2   Baso # 0.00 - 0.20 K/uL - - 0.06   Gran % 38.0 - 73.0 % - - 45.0   Lymph % 18.0 - 48.0 % - - 38.8   Mono% 4.0 -  15.0 % - - 11.5   Eos % 0.0 - 8.0 % - - 3.5   Baso % 0.0 - 1.9 % - - 1.0   Thyroperoxidase Antibodies 0 - 6 IU/mL - - -           Hemoglobin A1C   Date Value Ref Range Status   01/27/2015 4.8 4.5 - 6.2 % Final         Assessment and plan:       Problem List Items Addressed This Visit        Psychiatric    Depression    Current Assessment & Plan     Symptoms stable. On SSRI which can cause weight gain. F/u with PCP/psych.             Endocrine    Hypothyroid - Primary    Current Assessment & Plan     Pt clinically euthyroid. Fatigue sx's much improved with change in work environment. Switched from levothyroxine to branded Synthroid since last visit. Hold biotin for 1 week then recheck levels. Will send refill if labs appropriate.          Relevant Orders    TSH    TSH    Class 2 obesity due to excess calories without serious comorbidity with body mass index (BMI) of 37.0 to 37.9 in adult    Current Assessment & Plan     Encouraged pt to continue with healthy diet and exercise.            Other    Chronic fatigue    Current Assessment & Plan     Symptoms much improved after change in her work environment. Given this, will defer testing 8 a.m. cortisol and ACTH levels.  Thyroid hormone levels previously within normal limits.  Continue to monitor.                 TSH now (hold biotin one week prior)  Please cancel previously ordered 8 am ACTH and cortisol  RTC 6 months (virtual visit) with TSH

## 2020-07-09 NOTE — ASSESSMENT & PLAN NOTE
Pt clinically euthyroid. Fatigue sx's much improved with change in work environment. Switched from levothyroxine to branded Synthroid since last visit. Hold biotin for 1 week then recheck levels. Will send refill if labs appropriate.

## 2020-07-23 ENCOUNTER — LAB VISIT (OUTPATIENT)
Dept: LAB | Facility: HOSPITAL | Age: 40
End: 2020-07-23
Attending: INTERNAL MEDICINE
Payer: COMMERCIAL

## 2020-07-23 DIAGNOSIS — E06.3 HYPOTHYROIDISM DUE TO HASHIMOTO'S THYROIDITIS: ICD-10-CM

## 2020-07-23 DIAGNOSIS — E03.8 HYPOTHYROIDISM DUE TO HASHIMOTO'S THYROIDITIS: ICD-10-CM

## 2020-07-23 PROCEDURE — 36415 COLL VENOUS BLD VENIPUNCTURE: CPT | Mod: PO

## 2020-07-23 PROCEDURE — 84443 ASSAY THYROID STIM HORMONE: CPT

## 2020-07-24 ENCOUNTER — PATIENT MESSAGE (OUTPATIENT)
Dept: ENDOCRINOLOGY | Facility: CLINIC | Age: 40
End: 2020-07-24

## 2020-07-24 DIAGNOSIS — E03.8 HYPOTHYROIDISM DUE TO HASHIMOTO'S THYROIDITIS: ICD-10-CM

## 2020-07-24 DIAGNOSIS — E06.3 HYPOTHYROIDISM DUE TO HASHIMOTO'S THYROIDITIS: ICD-10-CM

## 2020-07-24 LAB — TSH SERPL DL<=0.005 MIU/L-ACNC: 2.38 UIU/ML (ref 0.4–4)

## 2020-07-28 DIAGNOSIS — E03.8 HYPOTHYROIDISM DUE TO HASHIMOTO'S THYROIDITIS: ICD-10-CM

## 2020-07-28 DIAGNOSIS — E06.3 HYPOTHYROIDISM DUE TO HASHIMOTO'S THYROIDITIS: ICD-10-CM

## 2020-07-28 RX ORDER — LEVOTHYROXINE SODIUM 75 UG/1
75 TABLET ORAL
Qty: 90 TABLET | Refills: 3 | Status: SHIPPED | OUTPATIENT
Start: 2020-07-28 | End: 2021-08-01

## 2020-07-28 NOTE — TELEPHONE ENCOUNTER
----- Message from Juliette L. Sandifer Kum-Nji, MD sent at 7/28/2020  1:22 PM CDT -----  Regarding: pharmacy  Please call pt and ask which pharmacy to send her refill too? I will write for Synthroid 75 mcg daily.

## 2020-07-30 ENCOUNTER — PATIENT OUTREACH (OUTPATIENT)
Dept: ADMINISTRATIVE | Facility: OTHER | Age: 40
End: 2020-07-30

## 2020-07-30 RX ORDER — LEVOTHYROXINE SODIUM 75 UG/1
75 TABLET ORAL
Qty: 90 TABLET | Refills: 3 | Status: SHIPPED | OUTPATIENT
Start: 2020-07-30 | End: 2021-06-21

## 2020-10-05 ENCOUNTER — PATIENT MESSAGE (OUTPATIENT)
Dept: ADMINISTRATIVE | Facility: HOSPITAL | Age: 40
End: 2020-10-05

## 2020-10-12 ENCOUNTER — OFFICE VISIT (OUTPATIENT)
Dept: FAMILY MEDICINE | Facility: CLINIC | Age: 40
End: 2020-10-12
Payer: COMMERCIAL

## 2020-10-12 VITALS
HEIGHT: 68 IN | SYSTOLIC BLOOD PRESSURE: 114 MMHG | WEIGHT: 263.25 LBS | OXYGEN SATURATION: 98 % | DIASTOLIC BLOOD PRESSURE: 76 MMHG | BODY MASS INDEX: 39.9 KG/M2 | HEART RATE: 59 BPM

## 2020-10-12 DIAGNOSIS — Z00.00 PREVENTATIVE HEALTH CARE: Primary | ICD-10-CM

## 2020-10-12 DIAGNOSIS — E66.01 MORBID OBESITY: ICD-10-CM

## 2020-10-12 DIAGNOSIS — E03.9 HYPOTHYROIDISM, UNSPECIFIED TYPE: ICD-10-CM

## 2020-10-12 DIAGNOSIS — F32.A DEPRESSION, UNSPECIFIED DEPRESSION TYPE: ICD-10-CM

## 2020-10-12 PROCEDURE — 3008F PR BODY MASS INDEX (BMI) DOCUMENTED: ICD-10-PCS | Mod: CPTII,S$GLB,, | Performed by: NURSE PRACTITIONER

## 2020-10-12 PROCEDURE — 99396 PREV VISIT EST AGE 40-64: CPT | Mod: S$GLB,,, | Performed by: NURSE PRACTITIONER

## 2020-10-12 PROCEDURE — 99999 PR PBB SHADOW E&M-EST. PATIENT-LVL III: ICD-10-PCS | Mod: PBBFAC,,, | Performed by: NURSE PRACTITIONER

## 2020-10-12 PROCEDURE — 3008F BODY MASS INDEX DOCD: CPT | Mod: CPTII,S$GLB,, | Performed by: NURSE PRACTITIONER

## 2020-10-12 PROCEDURE — 99396 PR PREVENTIVE VISIT,EST,40-64: ICD-10-PCS | Mod: S$GLB,,, | Performed by: NURSE PRACTITIONER

## 2020-10-12 PROCEDURE — 99999 PR PBB SHADOW E&M-EST. PATIENT-LVL III: CPT | Mod: PBBFAC,,, | Performed by: NURSE PRACTITIONER

## 2020-10-12 RX ORDER — INFLUENZA A VIRUS A/NEBRASKA/14/2019 (H1N1) ANTIGEN (MDCK CELL DERIVED, PROPIOLACTONE INACTIVATED), INFLUENZA A VIRUS A/DELAWARE/39/2019 (H3N2) ANTIGEN (MDCK CELL DERIVED, PROPIOLACTONE INACTIVATED), INFLUENZA B VIRUS B/SINGAPORE/INFTT-16-0610/2016 ANTIGEN (MDCK CELL DERIVED, PROPIOLACTONE INACTIVATED), INFLUENZA B VIRUS B/DARWIN/7/2019 ANTIGEN (MDCK CELL DERIVED, PROPIOLACTONE INACTIVATED) 15; 15; 15; 15 UG/.5ML; UG/.5ML; UG/.5ML; UG/.5ML
INJECTION, SUSPENSION INTRAMUSCULAR
COMMUNITY
Start: 2020-10-11 | End: 2023-07-17

## 2020-10-12 RX ORDER — CITALOPRAM 20 MG/1
TABLET, FILM COATED ORAL
Qty: 90 TABLET | Refills: 3 | Status: SHIPPED | OUTPATIENT
Start: 2020-10-12 | End: 2021-09-21

## 2020-10-12 NOTE — PROGRESS NOTES
Answers for HPI/ROS submitted by the patient on 10/12/2020   activity change: No  unexpected weight change: No  neck pain: No  hearing loss: No  rhinorrhea: No  trouble swallowing: No  eye discharge: No  visual disturbance: No  chest tightness: No  wheezing: No  chest pain: No  palpitations: No  blood in stool: No  constipation: No  vomiting: No  diarrhea: No  polydipsia: No  Subjective:       Patient ID: Jimena Lezama is a 39 y.o. female.    Chief Complaint: Annual Exam and Medication Refill    HPI   Patient presents for annual exam. She is without complaints    Hypothyroidism: followed by Dr. Sandifer. Last TSH 07/20 WNL  Depression: controlled on Celexa     GYN Dr. Woodall. UTD on HCM  Vitals:    10/12/20 1253   BP: 114/76   Pulse: (!) 59     Review of Systems   Constitutional: Negative for activity change and unexpected weight change.   HENT: Negative for hearing loss, rhinorrhea and trouble swallowing.    Eyes: Negative for discharge and visual disturbance.   Respiratory: Negative for chest tightness and wheezing.    Cardiovascular: Negative for chest pain and palpitations.   Gastrointestinal: Negative for blood in stool, constipation, diarrhea and vomiting.   Endocrine: Negative for polydipsia and polyuria.   Genitourinary: Negative for difficulty urinating, dysuria, hematuria and menstrual problem.   Musculoskeletal: Negative for arthralgias, joint swelling and neck pain.   Neurological: Negative for weakness and headaches.   Psychiatric/Behavioral: Negative for confusion and dysphoric mood.       Past Medical History:   Diagnosis Date    Anxiety     Hypothyroid     Irritable bowel     Obesity      Objective:      Physical Exam  Vitals signs and nursing note reviewed.   Constitutional:       General: She is not in acute distress.     Appearance: She is not diaphoretic.   HENT:      Head: Normocephalic.      Right Ear: Hearing normal.      Left Ear: Hearing normal.      Nose: Nose normal.    Eyes:      General: Lids are normal.      Conjunctiva/sclera: Conjunctivae normal.   Neck:      Vascular: No JVD.      Trachea: No tracheal deviation.   Cardiovascular:      Rate and Rhythm: Normal rate.      Heart sounds: Normal heart sounds.   Pulmonary:      Effort: Pulmonary effort is normal.      Breath sounds: Normal breath sounds.   Abdominal:      General: There is no distension.   Musculoskeletal:         General: No deformity.   Skin:     Coloration: Skin is not pale.   Neurological:      Mental Status: She is alert and oriented to person, place, and time.   Psychiatric:         Speech: Speech normal.         Behavior: Behavior normal.         Thought Content: Thought content normal.         Judgment: Judgment normal.         Assessment:       1. Preventative health care    2. Depression, unspecified depression type    3. Hypothyroidism, unspecified type    4. Morbid obesity        Plan:       Preventative health care  -     CBC auto differential; Future; Expected date: 10/12/2020  -     Comprehensive Metabolic Panel; Future; Expected date: 10/12/2020  -     Lipid Panel; Future; Expected date: 10/12/2020  -     Vitamin D; Future; Expected date: 10/12/2020    Depression, unspecified depression type  -     citalopram (CELEXA) 20 MG tablet; TAKE 1 TABLET(20 MG) BY MOUTH EVERY DAY  Dispense: 90 tablet; Refill: 3    Hypothyroidism, unspecified type   Stable; follow up as scheduled with endo     Follow up in about 1 year (around 10/12/2021).    Medication List with Changes/Refills   Current Medications    (MAGIC MOUTHWASH) 1:1:1 BENADRYL 12.5MG/5ML LIQ, ALUMINUM & MAGNESIUM HYDROXIDE-SIMEHTICONE (MAALOX), LIDOCAINE VISCOUS 2%    5-10 ml gargle and swallow every 4 hours as needed    FLUCELVAX QUAD 1526-5011, PF, 60 MCG (15 MCG X 4)/0.5 ML SYRG    PHARMACIST ADMINISTERED IMMUNIZATION ADMINISTERED AT TIME OF DISPENSING    LEVOTHYROXINE (SYNTHROID) 75 MCG TABLET    Take 1 tablet (75 mcg total) by mouth before  breakfast.    LEVOTHYROXINE (SYNTHROID) 75 MCG TABLET    Take 1 tablet (75 mcg total) by mouth before breakfast.   Changed and/or Refilled Medications    Modified Medication Previous Medication    CITALOPRAM (CELEXA) 20 MG TABLET citalopram (CELEXA) 20 MG tablet       TAKE 1 TABLET(20 MG) BY MOUTH EVERY DAY    TAKE 1 TABLET(20 MG) BY MOUTH EVERY DAY       polyuria: No  difficulty urinating: No  hematuria: No  menstrual problem: No  dysuria: No  joint swelling: No  arthralgias: No  headaches: No  weakness: No  confusion: No  dysphoric mood: No

## 2020-11-03 LAB
HUMAN PAPILLOMAVIRUS (HPV): NORMAL
HUMAN PAPILLOMAVIRUS (HPV): NORMAL
PAP SMEAR: NORMAL

## 2021-01-04 ENCOUNTER — PATIENT MESSAGE (OUTPATIENT)
Dept: ADMINISTRATIVE | Facility: HOSPITAL | Age: 41
End: 2021-01-04

## 2021-01-08 ENCOUNTER — PATIENT OUTREACH (OUTPATIENT)
Dept: ADMINISTRATIVE | Facility: OTHER | Age: 41
End: 2021-01-08

## 2021-01-16 ENCOUNTER — CLINICAL SUPPORT (OUTPATIENT)
Dept: URGENT CARE | Facility: CLINIC | Age: 41
End: 2021-01-16
Payer: COMMERCIAL

## 2021-01-16 VITALS
RESPIRATION RATE: 18 BRPM | DIASTOLIC BLOOD PRESSURE: 59 MMHG | HEART RATE: 68 BPM | WEIGHT: 264 LBS | SYSTOLIC BLOOD PRESSURE: 128 MMHG | BODY MASS INDEX: 40.01 KG/M2 | HEIGHT: 68 IN | OXYGEN SATURATION: 97 % | TEMPERATURE: 97 F

## 2021-01-16 DIAGNOSIS — Z20.822 ENCOUNTER FOR LABORATORY TESTING FOR COVID-19 VIRUS: ICD-10-CM

## 2021-01-16 DIAGNOSIS — Z20.828 EXPOSURE TO SARS-ASSOCIATED CORONAVIRUS: ICD-10-CM

## 2021-01-16 PROCEDURE — 99213 OFFICE O/P EST LOW 20 MIN: CPT | Mod: S$GLB,,, | Performed by: INTERNAL MEDICINE

## 2021-01-16 PROCEDURE — U0003 INFECTIOUS AGENT DETECTION BY NUCLEIC ACID (DNA OR RNA); SEVERE ACUTE RESPIRATORY SYNDROME CORONAVIRUS 2 (SARS-COV-2) (CORONAVIRUS DISEASE [COVID-19]), AMPLIFIED PROBE TECHNIQUE, MAKING USE OF HIGH THROUGHPUT TECHNOLOGIES AS DESCRIBED BY CMS-2020-01-R: HCPCS

## 2021-01-16 PROCEDURE — 99213 PR OFFICE/OUTPT VISIT, EST, LEVL III, 20-29 MIN: ICD-10-PCS | Mod: S$GLB,,, | Performed by: INTERNAL MEDICINE

## 2021-01-17 LAB — SARS-COV-2 RNA RESP QL NAA+PROBE: NOT DETECTED

## 2021-01-20 ENCOUNTER — LAB VISIT (OUTPATIENT)
Dept: PRIMARY CARE CLINIC | Facility: OTHER | Age: 41
End: 2021-01-20
Attending: INTERNAL MEDICINE
Payer: COMMERCIAL

## 2021-01-20 DIAGNOSIS — Z20.822 ENCOUNTER FOR LABORATORY TESTING FOR COVID-19 VIRUS: ICD-10-CM

## 2021-01-20 PROCEDURE — U0003 INFECTIOUS AGENT DETECTION BY NUCLEIC ACID (DNA OR RNA); SEVERE ACUTE RESPIRATORY SYNDROME CORONAVIRUS 2 (SARS-COV-2) (CORONAVIRUS DISEASE [COVID-19]), AMPLIFIED PROBE TECHNIQUE, MAKING USE OF HIGH THROUGHPUT TECHNOLOGIES AS DESCRIBED BY CMS-2020-01-R: HCPCS

## 2021-01-21 LAB — SARS-COV-2 RNA RESP QL NAA+PROBE: NOT DETECTED

## 2021-03-02 ENCOUNTER — PATIENT OUTREACH (OUTPATIENT)
Dept: ADMINISTRATIVE | Facility: HOSPITAL | Age: 41
End: 2021-03-02

## 2021-03-23 ENCOUNTER — PATIENT MESSAGE (OUTPATIENT)
Dept: ENDOCRINOLOGY | Facility: CLINIC | Age: 41
End: 2021-03-23

## 2021-03-23 ENCOUNTER — PATIENT MESSAGE (OUTPATIENT)
Dept: FAMILY MEDICINE | Facility: CLINIC | Age: 41
End: 2021-03-23

## 2021-03-23 DIAGNOSIS — E03.8 HYPOTHYROIDISM DUE TO HASHIMOTO'S THYROIDITIS: Primary | ICD-10-CM

## 2021-03-23 DIAGNOSIS — E06.3 HYPOTHYROIDISM DUE TO HASHIMOTO'S THYROIDITIS: Primary | ICD-10-CM

## 2021-06-21 ENCOUNTER — PATIENT MESSAGE (OUTPATIENT)
Dept: ENDOCRINOLOGY | Facility: CLINIC | Age: 41
End: 2021-06-21

## 2021-06-21 ENCOUNTER — OFFICE VISIT (OUTPATIENT)
Dept: ENDOCRINOLOGY | Facility: CLINIC | Age: 41
End: 2021-06-21
Payer: COMMERCIAL

## 2021-06-21 VITALS
BODY MASS INDEX: 40.86 KG/M2 | TEMPERATURE: 99 F | HEART RATE: 66 BPM | HEIGHT: 68 IN | WEIGHT: 269.63 LBS | SYSTOLIC BLOOD PRESSURE: 112 MMHG | OXYGEN SATURATION: 97 % | DIASTOLIC BLOOD PRESSURE: 82 MMHG

## 2021-06-21 DIAGNOSIS — R23.2 HOT FLASHES: ICD-10-CM

## 2021-06-21 DIAGNOSIS — E06.3 HYPOTHYROIDISM DUE TO HASHIMOTO'S THYROIDITIS: Primary | ICD-10-CM

## 2021-06-21 DIAGNOSIS — E03.8 HYPOTHYROIDISM DUE TO HASHIMOTO'S THYROIDITIS: Primary | ICD-10-CM

## 2021-06-21 DIAGNOSIS — R53.82 CHRONIC FATIGUE: ICD-10-CM

## 2021-06-21 DIAGNOSIS — E55.9 HYPOVITAMINOSIS D: ICD-10-CM

## 2021-06-21 PROCEDURE — 99999 PR PBB SHADOW E&M-EST. PATIENT-LVL III: CPT | Mod: PBBFAC,,, | Performed by: PHYSICIAN ASSISTANT

## 2021-06-21 PROCEDURE — 99999 PR PBB SHADOW E&M-EST. PATIENT-LVL III: ICD-10-PCS | Mod: PBBFAC,,, | Performed by: PHYSICIAN ASSISTANT

## 2021-06-21 PROCEDURE — 99213 PR OFFICE/OUTPT VISIT, EST, LEVL III, 20-29 MIN: ICD-10-PCS | Mod: S$GLB,,, | Performed by: PHYSICIAN ASSISTANT

## 2021-06-21 PROCEDURE — 99213 OFFICE O/P EST LOW 20 MIN: CPT | Mod: S$GLB,,, | Performed by: PHYSICIAN ASSISTANT

## 2021-06-23 ENCOUNTER — PATIENT MESSAGE (OUTPATIENT)
Dept: ENDOCRINOLOGY | Facility: CLINIC | Age: 41
End: 2021-06-23

## 2021-06-26 ENCOUNTER — LAB VISIT (OUTPATIENT)
Dept: LAB | Facility: HOSPITAL | Age: 41
End: 2021-06-26
Attending: PHYSICIAN ASSISTANT
Payer: COMMERCIAL

## 2021-06-26 DIAGNOSIS — E55.9 HYPOVITAMINOSIS D: ICD-10-CM

## 2021-06-26 DIAGNOSIS — E06.3 HYPOTHYROIDISM DUE TO HASHIMOTO'S THYROIDITIS: ICD-10-CM

## 2021-06-26 DIAGNOSIS — R53.82 CHRONIC FATIGUE: ICD-10-CM

## 2021-06-26 DIAGNOSIS — R23.2 HOT FLASHES: ICD-10-CM

## 2021-06-26 DIAGNOSIS — E03.8 HYPOTHYROIDISM DUE TO HASHIMOTO'S THYROIDITIS: ICD-10-CM

## 2021-06-26 LAB
25(OH)D3+25(OH)D2 SERPL-MCNC: 25 NG/ML (ref 30–96)
ALBUMIN SERPL BCP-MCNC: 4.3 G/DL (ref 3.5–5.2)
ALP SERPL-CCNC: 70 U/L (ref 55–135)
ALT SERPL W/O P-5'-P-CCNC: 17 U/L (ref 10–44)
ANION GAP SERPL CALC-SCNC: 10 MMOL/L (ref 8–16)
AST SERPL-CCNC: 20 U/L (ref 10–40)
BILIRUB SERPL-MCNC: 0.5 MG/DL (ref 0.1–1)
BUN SERPL-MCNC: 11 MG/DL (ref 6–20)
CALCIUM SERPL-MCNC: 9.2 MG/DL (ref 8.7–10.5)
CHLORIDE SERPL-SCNC: 107 MMOL/L (ref 95–110)
CO2 SERPL-SCNC: 21 MMOL/L (ref 23–29)
CORTIS SERPL-MCNC: 10.8 UG/DL
CREAT SERPL-MCNC: 0.7 MG/DL (ref 0.5–1.4)
EST. GFR  (AFRICAN AMERICAN): >60 ML/MIN/1.73 M^2
EST. GFR  (NON AFRICAN AMERICAN): >60 ML/MIN/1.73 M^2
GLUCOSE SERPL-MCNC: 88 MG/DL (ref 70–110)
POTASSIUM SERPL-SCNC: 4 MMOL/L (ref 3.5–5.1)
PROGEST SERPL-MCNC: 1.8 NG/ML
PROT SERPL-MCNC: 7.2 G/DL (ref 6–8.4)
SODIUM SERPL-SCNC: 138 MMOL/L (ref 136–145)
T3 SERPL-MCNC: 56 NG/DL (ref 60–180)
T4 FREE SERPL-MCNC: 0.92 NG/DL (ref 0.71–1.51)
TSH SERPL DL<=0.005 MIU/L-ACNC: 2 UIU/ML (ref 0.4–4)

## 2021-06-26 PROCEDURE — 82306 VITAMIN D 25 HYDROXY: CPT | Performed by: PHYSICIAN ASSISTANT

## 2021-06-26 PROCEDURE — 84144 ASSAY OF PROGESTERONE: CPT | Performed by: PHYSICIAN ASSISTANT

## 2021-06-26 PROCEDURE — 80053 COMPREHEN METABOLIC PANEL: CPT | Performed by: PHYSICIAN ASSISTANT

## 2021-06-26 PROCEDURE — 82024 ASSAY OF ACTH: CPT | Performed by: PHYSICIAN ASSISTANT

## 2021-06-26 PROCEDURE — 82671 ASSAY OF ESTROGENS: CPT | Performed by: PHYSICIAN ASSISTANT

## 2021-06-26 PROCEDURE — 84439 ASSAY OF FREE THYROXINE: CPT | Performed by: PHYSICIAN ASSISTANT

## 2021-06-26 PROCEDURE — 84403 ASSAY OF TOTAL TESTOSTERONE: CPT | Performed by: PHYSICIAN ASSISTANT

## 2021-06-26 PROCEDURE — 82533 TOTAL CORTISOL: CPT | Performed by: PHYSICIAN ASSISTANT

## 2021-06-26 PROCEDURE — 84480 ASSAY TRIIODOTHYRONINE (T3): CPT | Performed by: PHYSICIAN ASSISTANT

## 2021-06-26 PROCEDURE — 84443 ASSAY THYROID STIM HORMONE: CPT | Performed by: PHYSICIAN ASSISTANT

## 2021-06-29 LAB — ACTH PLAS-MCNC: 28 PG/ML (ref 0–46)

## 2021-07-01 LAB
ESTRADIOL SERPL-MCNC: 53 PG/ML
ESTRONE SERPL-MCNC: 61 PG/ML

## 2021-07-02 ENCOUNTER — PATIENT MESSAGE (OUTPATIENT)
Dept: ENDOCRINOLOGY | Facility: CLINIC | Age: 41
End: 2021-07-02

## 2021-07-02 DIAGNOSIS — E03.8 HYPOTHYROIDISM DUE TO HASHIMOTO'S THYROIDITIS: Primary | ICD-10-CM

## 2021-07-02 DIAGNOSIS — E06.3 HYPOTHYROIDISM DUE TO HASHIMOTO'S THYROIDITIS: Primary | ICD-10-CM

## 2021-07-03 ENCOUNTER — PATIENT MESSAGE (OUTPATIENT)
Dept: ENDOCRINOLOGY | Facility: CLINIC | Age: 41
End: 2021-07-03

## 2021-07-03 LAB
ALBUMIN SERPL-MCNC: 4.4 G/DL (ref 3.6–5.1)
SHBG SERPL-SCNC: 40 NMOL/L (ref 17–124)
TESTOST FREE SERPL-MCNC: 3.1 PG/ML (ref 0.2–5)
TESTOST SERPL-MCNC: 31 NG/DL (ref 2–45)
TESTOSTERONE.FREE+WB SERPL-MCNC: 6.3 NG/DL (ref 0.5–8.5)

## 2021-07-03 RX ORDER — LIOTHYRONINE SODIUM 5 UG/1
TABLET ORAL
Qty: 30 TABLET | Refills: 11 | Status: SHIPPED | OUTPATIENT
Start: 2021-07-03 | End: 2021-12-21 | Stop reason: SDUPTHER

## 2021-09-24 DIAGNOSIS — M25.521 RIGHT ELBOW PAIN: Primary | ICD-10-CM

## 2021-09-27 ENCOUNTER — HOSPITAL ENCOUNTER (OUTPATIENT)
Dept: RADIOLOGY | Facility: HOSPITAL | Age: 41
Discharge: HOME | End: 2021-09-27
Attending: ORTHOPAEDIC SURGERY
Payer: COMMERCIAL

## 2021-09-27 ENCOUNTER — OFFICE VISIT (OUTPATIENT)
Dept: ORTHOPEDICS | Facility: CLINIC | Age: 41
End: 2021-09-27
Payer: COMMERCIAL

## 2021-09-27 VITALS — WEIGHT: 269 LBS | BODY MASS INDEX: 40.77 KG/M2 | HEIGHT: 68 IN

## 2021-09-27 DIAGNOSIS — M25.521 RIGHT ELBOW PAIN: ICD-10-CM

## 2021-09-27 DIAGNOSIS — M25.521 RIGHT ELBOW PAIN: Primary | ICD-10-CM

## 2021-09-27 DIAGNOSIS — M19.021 ARTHRITIS OF RIGHT ELBOW: ICD-10-CM

## 2021-09-27 PROCEDURE — 73080 XR ELBOW COMPLETE 3 VIEW RIGHT: ICD-10-PCS | Mod: 26,RT,, | Performed by: RADIOLOGY

## 2021-09-27 PROCEDURE — 99999 PR PBB SHADOW E&M-EST. PATIENT-LVL III: CPT | Mod: PBBFAC,,, | Performed by: ORTHOPAEDIC SURGERY

## 2021-09-27 PROCEDURE — 73080 X-RAY EXAM OF ELBOW: CPT | Mod: 26,RT,, | Performed by: RADIOLOGY

## 2021-09-27 PROCEDURE — 99243 OFF/OP CNSLTJ NEW/EST LOW 30: CPT | Mod: S$GLB,,, | Performed by: ORTHOPAEDIC SURGERY

## 2021-09-27 PROCEDURE — 99243 PR OFFICE CONSULTATION,LEVEL III: ICD-10-PCS | Mod: S$GLB,,, | Performed by: ORTHOPAEDIC SURGERY

## 2021-09-27 PROCEDURE — 73080 X-RAY EXAM OF ELBOW: CPT | Mod: TC,PO,RT

## 2021-09-27 PROCEDURE — 99999 PR PBB SHADOW E&M-EST. PATIENT-LVL III: ICD-10-PCS | Mod: PBBFAC,,, | Performed by: ORTHOPAEDIC SURGERY

## 2021-11-19 DIAGNOSIS — F32.A DEPRESSION, UNSPECIFIED DEPRESSION TYPE: ICD-10-CM

## 2021-11-19 RX ORDER — CITALOPRAM 20 MG/1
TABLET, FILM COATED ORAL
Qty: 90 TABLET | Refills: 0 | Status: SHIPPED | OUTPATIENT
Start: 2021-11-19 | End: 2022-03-25 | Stop reason: SDUPTHER

## 2021-11-23 ENCOUNTER — TELEPHONE (OUTPATIENT)
Dept: ENDOCRINOLOGY | Facility: CLINIC | Age: 41
End: 2021-11-23
Payer: COMMERCIAL

## 2021-12-11 ENCOUNTER — PATIENT MESSAGE (OUTPATIENT)
Dept: ENDOCRINOLOGY | Facility: CLINIC | Age: 41
End: 2021-12-11
Payer: COMMERCIAL

## 2021-12-14 ENCOUNTER — LAB VISIT (OUTPATIENT)
Dept: LAB | Facility: HOSPITAL | Age: 41
End: 2021-12-14
Attending: INTERNAL MEDICINE
Payer: COMMERCIAL

## 2021-12-14 DIAGNOSIS — E06.3 HYPOTHYROIDISM DUE TO HASHIMOTO'S THYROIDITIS: ICD-10-CM

## 2021-12-14 DIAGNOSIS — E03.8 HYPOTHYROIDISM DUE TO HASHIMOTO'S THYROIDITIS: ICD-10-CM

## 2021-12-14 LAB
T4 FREE SERPL-MCNC: 0.78 NG/DL (ref 0.71–1.51)
TSH SERPL DL<=0.005 MIU/L-ACNC: 1.35 UIU/ML (ref 0.4–4)

## 2021-12-14 PROCEDURE — 84439 ASSAY OF FREE THYROXINE: CPT | Performed by: PHYSICIAN ASSISTANT

## 2021-12-14 PROCEDURE — 84443 ASSAY THYROID STIM HORMONE: CPT | Performed by: PHYSICIAN ASSISTANT

## 2021-12-14 PROCEDURE — 36415 COLL VENOUS BLD VENIPUNCTURE: CPT | Mod: PO | Performed by: PHYSICIAN ASSISTANT

## 2021-12-21 ENCOUNTER — OFFICE VISIT (OUTPATIENT)
Dept: ENDOCRINOLOGY | Facility: CLINIC | Age: 41
End: 2021-12-21
Payer: COMMERCIAL

## 2021-12-21 VITALS
DIASTOLIC BLOOD PRESSURE: 80 MMHG | TEMPERATURE: 98 F | BODY MASS INDEX: 40.6 KG/M2 | SYSTOLIC BLOOD PRESSURE: 118 MMHG | HEIGHT: 68 IN | HEART RATE: 70 BPM | OXYGEN SATURATION: 98 % | WEIGHT: 267.88 LBS

## 2021-12-21 DIAGNOSIS — E04.9 GOITER: ICD-10-CM

## 2021-12-21 DIAGNOSIS — E55.9 HYPOVITAMINOSIS D: ICD-10-CM

## 2021-12-21 DIAGNOSIS — E06.3 HYPOTHYROIDISM DUE TO HASHIMOTO'S THYROIDITIS: Primary | ICD-10-CM

## 2021-12-21 DIAGNOSIS — R53.82 CHRONIC FATIGUE: ICD-10-CM

## 2021-12-21 DIAGNOSIS — E03.8 HYPOTHYROIDISM DUE TO HASHIMOTO'S THYROIDITIS: Primary | ICD-10-CM

## 2021-12-21 PROCEDURE — 99213 OFFICE O/P EST LOW 20 MIN: CPT | Mod: S$GLB,,, | Performed by: PHYSICIAN ASSISTANT

## 2021-12-21 PROCEDURE — 99213 PR OFFICE/OUTPT VISIT, EST, LEVL III, 20-29 MIN: ICD-10-PCS | Mod: S$GLB,,, | Performed by: PHYSICIAN ASSISTANT

## 2021-12-21 PROCEDURE — 99999 PR PBB SHADOW E&M-EST. PATIENT-LVL III: CPT | Mod: PBBFAC,,, | Performed by: PHYSICIAN ASSISTANT

## 2021-12-21 PROCEDURE — 99999 PR PBB SHADOW E&M-EST. PATIENT-LVL III: ICD-10-PCS | Mod: PBBFAC,,, | Performed by: PHYSICIAN ASSISTANT

## 2021-12-21 RX ORDER — LEVOTHYROXINE SODIUM 75 UG/1
TABLET ORAL
Qty: 90 TABLET | Refills: 3 | Status: SHIPPED | OUTPATIENT
Start: 2021-12-21 | End: 2022-05-09 | Stop reason: SDUPTHER

## 2021-12-21 RX ORDER — LIOTHYRONINE SODIUM 5 UG/1
TABLET ORAL
Qty: 90 TABLET | Refills: 3 | Status: SHIPPED | OUTPATIENT
Start: 2021-12-21 | End: 2022-05-09 | Stop reason: SDUPTHER

## 2022-03-25 ENCOUNTER — PATIENT MESSAGE (OUTPATIENT)
Dept: FAMILY MEDICINE | Facility: CLINIC | Age: 42
End: 2022-03-25
Payer: COMMERCIAL

## 2022-03-25 DIAGNOSIS — F32.A DEPRESSION, UNSPECIFIED DEPRESSION TYPE: ICD-10-CM

## 2022-03-28 RX ORDER — CITALOPRAM 20 MG/1
20 TABLET, FILM COATED ORAL DAILY
Qty: 90 TABLET | Refills: 1 | Status: SHIPPED | OUTPATIENT
Start: 2022-03-28 | End: 2022-05-23 | Stop reason: SDUPTHER

## 2022-05-09 DIAGNOSIS — E06.3 HYPOTHYROIDISM DUE TO HASHIMOTO'S THYROIDITIS: ICD-10-CM

## 2022-05-09 DIAGNOSIS — E03.8 HYPOTHYROIDISM DUE TO HASHIMOTO'S THYROIDITIS: ICD-10-CM

## 2022-05-09 RX ORDER — LEVOTHYROXINE SODIUM 75 UG/1
TABLET ORAL
Qty: 90 TABLET | Refills: 3 | Status: SHIPPED | OUTPATIENT
Start: 2022-05-09 | End: 2022-12-21 | Stop reason: SDUPTHER

## 2022-05-09 RX ORDER — LIOTHYRONINE SODIUM 5 UG/1
TABLET ORAL
Qty: 90 TABLET | Refills: 3 | Status: SHIPPED | OUTPATIENT
Start: 2022-05-09 | End: 2022-10-03

## 2022-05-23 ENCOUNTER — PATIENT MESSAGE (OUTPATIENT)
Dept: FAMILY MEDICINE | Facility: CLINIC | Age: 42
End: 2022-05-23
Payer: COMMERCIAL

## 2022-05-23 ENCOUNTER — PATIENT MESSAGE (OUTPATIENT)
Dept: ENDOCRINOLOGY | Facility: CLINIC | Age: 42
End: 2022-05-23
Payer: COMMERCIAL

## 2022-05-23 DIAGNOSIS — Z00.00 PREVENTATIVE HEALTH CARE: Primary | ICD-10-CM

## 2022-05-23 DIAGNOSIS — F32.A DEPRESSION, UNSPECIFIED DEPRESSION TYPE: ICD-10-CM

## 2022-05-23 RX ORDER — CITALOPRAM 20 MG/1
20 TABLET, FILM COATED ORAL DAILY
Qty: 90 TABLET | Refills: 1 | Status: SHIPPED | OUTPATIENT
Start: 2022-05-23 | End: 2022-09-04

## 2022-05-24 ENCOUNTER — LAB VISIT (OUTPATIENT)
Dept: LAB | Facility: HOSPITAL | Age: 42
End: 2022-05-24
Attending: NURSE PRACTITIONER
Payer: COMMERCIAL

## 2022-05-24 DIAGNOSIS — Z00.00 PREVENTATIVE HEALTH CARE: ICD-10-CM

## 2022-05-24 LAB
ALBUMIN SERPL BCP-MCNC: 4 G/DL (ref 3.5–5.2)
ALP SERPL-CCNC: 70 U/L (ref 55–135)
ALT SERPL W/O P-5'-P-CCNC: 25 U/L (ref 10–44)
ANION GAP SERPL CALC-SCNC: 9 MMOL/L (ref 8–16)
AST SERPL-CCNC: 26 U/L (ref 10–40)
BASOPHILS # BLD AUTO: 0.06 K/UL (ref 0–0.2)
BASOPHILS NFR BLD: 1.1 % (ref 0–1.9)
BILIRUB SERPL-MCNC: 0.4 MG/DL (ref 0.1–1)
BUN SERPL-MCNC: 14 MG/DL (ref 6–20)
CALCIUM SERPL-MCNC: 8.9 MG/DL (ref 8.7–10.5)
CHLORIDE SERPL-SCNC: 106 MMOL/L (ref 95–110)
CHOLEST SERPL-MCNC: 169 MG/DL (ref 120–199)
CHOLEST/HDLC SERPL: 3 {RATIO} (ref 2–5)
CO2 SERPL-SCNC: 23 MMOL/L (ref 23–29)
CREAT SERPL-MCNC: 0.7 MG/DL (ref 0.5–1.4)
DIFFERENTIAL METHOD: NORMAL
EOSINOPHIL # BLD AUTO: 0.1 K/UL (ref 0–0.5)
EOSINOPHIL NFR BLD: 2.6 % (ref 0–8)
ERYTHROCYTE [DISTWIDTH] IN BLOOD BY AUTOMATED COUNT: 13.2 % (ref 11.5–14.5)
EST. GFR  (AFRICAN AMERICAN): >60 ML/MIN/1.73 M^2
EST. GFR  (NON AFRICAN AMERICAN): >60 ML/MIN/1.73 M^2
GLUCOSE SERPL-MCNC: 92 MG/DL (ref 70–110)
HCT VFR BLD AUTO: 38.8 % (ref 37–48.5)
HDLC SERPL-MCNC: 57 MG/DL (ref 40–75)
HDLC SERPL: 33.7 % (ref 20–50)
HGB BLD-MCNC: 12.8 G/DL (ref 12–16)
IMM GRANULOCYTES # BLD AUTO: 0.02 K/UL (ref 0–0.04)
IMM GRANULOCYTES NFR BLD AUTO: 0.4 % (ref 0–0.5)
LDLC SERPL CALC-MCNC: 95.2 MG/DL (ref 63–159)
LYMPHOCYTES # BLD AUTO: 2 K/UL (ref 1–4.8)
LYMPHOCYTES NFR BLD: 36.5 % (ref 18–48)
MCH RBC QN AUTO: 30 PG (ref 27–31)
MCHC RBC AUTO-ENTMCNC: 33 G/DL (ref 32–36)
MCV RBC AUTO: 91 FL (ref 82–98)
MONOCYTES # BLD AUTO: 0.7 K/UL (ref 0.3–1)
MONOCYTES NFR BLD: 12.5 % (ref 4–15)
NEUTROPHILS # BLD AUTO: 2.6 K/UL (ref 1.8–7.7)
NEUTROPHILS NFR BLD: 46.9 % (ref 38–73)
NONHDLC SERPL-MCNC: 112 MG/DL
NRBC BLD-RTO: 0 /100 WBC
PLATELET # BLD AUTO: 258 K/UL (ref 150–450)
PMV BLD AUTO: 11.8 FL (ref 9.2–12.9)
POTASSIUM SERPL-SCNC: 4.1 MMOL/L (ref 3.5–5.1)
PROT SERPL-MCNC: 6.7 G/DL (ref 6–8.4)
RBC # BLD AUTO: 4.26 M/UL (ref 4–5.4)
SODIUM SERPL-SCNC: 138 MMOL/L (ref 136–145)
TRIGL SERPL-MCNC: 84 MG/DL (ref 30–150)
TSH SERPL DL<=0.005 MIU/L-ACNC: 3.14 UIU/ML (ref 0.4–4)
WBC # BLD AUTO: 5.43 K/UL (ref 3.9–12.7)

## 2022-05-24 PROCEDURE — 84443 ASSAY THYROID STIM HORMONE: CPT | Performed by: NURSE PRACTITIONER

## 2022-05-24 PROCEDURE — 85025 COMPLETE CBC W/AUTO DIFF WBC: CPT | Performed by: NURSE PRACTITIONER

## 2022-05-24 PROCEDURE — 36415 COLL VENOUS BLD VENIPUNCTURE: CPT | Mod: PO | Performed by: NURSE PRACTITIONER

## 2022-05-24 PROCEDURE — 80061 LIPID PANEL: CPT | Performed by: NURSE PRACTITIONER

## 2022-05-24 PROCEDURE — 80053 COMPREHEN METABOLIC PANEL: CPT | Performed by: NURSE PRACTITIONER

## 2022-05-25 ENCOUNTER — PATIENT MESSAGE (OUTPATIENT)
Dept: FAMILY MEDICINE | Facility: CLINIC | Age: 42
End: 2022-05-25
Payer: COMMERCIAL

## 2022-05-26 ENCOUNTER — HOSPITAL ENCOUNTER (OUTPATIENT)
Dept: RADIOLOGY | Facility: HOSPITAL | Age: 42
Discharge: HOME | End: 2022-05-26
Attending: PHYSICIAN ASSISTANT
Payer: COMMERCIAL

## 2022-05-26 DIAGNOSIS — E04.9 GOITER: ICD-10-CM

## 2022-05-26 PROCEDURE — 76536 US EXAM OF HEAD AND NECK: CPT | Mod: 26,,, | Performed by: RADIOLOGY

## 2022-05-26 PROCEDURE — 76536 US EXAM OF HEAD AND NECK: CPT | Mod: TC,PO

## 2022-05-26 PROCEDURE — 76536 US SOFT TISSUE HEAD NECK THYROID: ICD-10-PCS | Mod: 26,,, | Performed by: RADIOLOGY

## 2022-05-31 ENCOUNTER — PATIENT MESSAGE (OUTPATIENT)
Dept: ENDOCRINOLOGY | Facility: CLINIC | Age: 42
End: 2022-05-31
Payer: COMMERCIAL

## 2022-09-14 DIAGNOSIS — Z12.31 OTHER SCREENING MAMMOGRAM: ICD-10-CM

## 2022-09-19 ENCOUNTER — PATIENT MESSAGE (OUTPATIENT)
Dept: ADMINISTRATIVE | Facility: HOSPITAL | Age: 42
End: 2022-09-19
Payer: COMMERCIAL

## 2022-12-21 ENCOUNTER — OFFICE VISIT (OUTPATIENT)
Dept: ENDOCRINOLOGY | Facility: CLINIC | Age: 42
End: 2022-12-21
Payer: COMMERCIAL

## 2022-12-21 DIAGNOSIS — E06.3 HYPOTHYROIDISM DUE TO HASHIMOTO'S THYROIDITIS: Primary | ICD-10-CM

## 2022-12-21 DIAGNOSIS — E03.8 HYPOTHYROIDISM DUE TO HASHIMOTO'S THYROIDITIS: Primary | ICD-10-CM

## 2022-12-21 DIAGNOSIS — E55.9 HYPOVITAMINOSIS D: ICD-10-CM

## 2022-12-21 PROCEDURE — 99213 OFFICE O/P EST LOW 20 MIN: CPT | Mod: 95,,, | Performed by: PHYSICIAN ASSISTANT

## 2022-12-21 PROCEDURE — 99213 PR OFFICE/OUTPT VISIT, EST, LEVL III, 20-29 MIN: ICD-10-PCS | Mod: 95,,, | Performed by: PHYSICIAN ASSISTANT

## 2022-12-21 RX ORDER — LIOTHYRONINE SODIUM 5 UG/1
TABLET ORAL
Qty: 30 TABLET | Refills: 6 | Status: SHIPPED | OUTPATIENT
Start: 2022-12-21 | End: 2023-07-11

## 2022-12-21 RX ORDER — LEVOTHYROXINE SODIUM 75 UG/1
TABLET ORAL
Qty: 90 TABLET | Refills: 3 | Status: SHIPPED | OUTPATIENT
Start: 2022-12-21 | End: 2022-12-28

## 2022-12-21 NOTE — PROGRESS NOTES
Patient ID:  Jimena Lezama is a 41 y.o. female.    The patient location is: Home  The chief complaint leading to consultation is: Hypothyroidism    Visit type: audiovisual    Face to Face time with patient: 10 min  15 minutes of total time spent on the encounter, which includes face to face time and non-face to face time preparing to see the patient (eg, review of tests), Obtaining and/or reviewing separately obtained history, Documenting clinical information in the electronic or other health record, Independently interpreting results (not separately reported) and communicating results to the patient/family/caregiver, or Care coordination (not separately reported).     Each patient to whom he or she provides medical services by telemedicine is:  (1) informed of the relationship between the physician and patient and the respective role of any other health care provider with respect to management of the patient; and (2) notified that he or she may decline to receive medical services by telemedicine and may withdraw from such care at any time.    Chief Complaint:  Hypothyroidism      Pt presents to follow up for hypothyroidism.      Overview: Onset of Hashimoto's was in 2008. Notes symptoms occurred after having kids. Had 4 kids in 5 years, and last had twins. Has been on Levothyroxine 75 mcg daily for the last 10 years. Notes history of anxiety and has been taking Celexa. Previously saw Dr. Sandifer and Dr. Robles.  She is using testosterone pellets from her GYN w/out estrogen.     Hypothyroidism  LT4 75 mcg qd  Lt3 5 mcg daily    Denies in hair loss, memory changes, fatigue, constipation, cold intolerance.     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 Her brother has hypothyroidism. Works out 5 days a week.     She is on a glutean free diet. Takes supplements magnesium, glucosamine/chronditin, probiotics, vitamin D, biotin, and collagen.  +TPO in the past.    Has restless legs at night. Notes low BP and low heart rate.  Notes mom has celiac and sister has gluten sensitivity.    Reports controlled reactive hypoglycemia. Reports low libido.     Denies voice changes, hoarseness or history of head/neck radiation. Had a thyroid US 10 years ago that showed goiter but no nodules. Thyroid u/s 5/22 did not show any nodules and normal sized thyroid.  Cycles are regular. Taking vd.     Has nutritionist and is doing well with diet.      Weighs 270 lbs today. Reports body fat percentage has decreased.  Wt Readings from Last 6 Encounters:   12/21/21 121.5 kg (267 lb 13.7 oz)   09/27/21 122 kg (269 lb)   06/21/21 122.3 kg (269 lb 10 oz)   01/16/21 119.7 kg (264 lb)   10/12/20 119.4 kg (263 lb 3.7 oz)   06/29/20 111.1 kg (245 lb)      Review of Systems   Constitutional:  Negative for diaphoresis, fatigue and unexpected weight change.   HENT:  Negative for trouble swallowing and voice change.    Eyes:  Negative for visual disturbance.   Respiratory:  Negative for shortness of breath.    Cardiovascular:  Negative for chest pain.   Gastrointestinal:  Negative for abdominal pain, constipation, diarrhea, nausea and vomiting.   Endocrine: Negative for cold intolerance and heat intolerance.   Musculoskeletal:  Negative for myalgias.   Skin:  Negative for wound.   Neurological:  Negative for headaches.   Hematological:  Negative for adenopathy.   Psychiatric/Behavioral:  Negative for sleep disturbance.       Past Medical History:   Diagnosis Date    Anxiety     Hypothyroid     Irritable bowel     Obesity       Social History     Tobacco Use    Smoking status: Never    Smokeless tobacco: Never   Substance Use Topics    Alcohol use: Yes     Comment: social    Drug use: No     Family History   Problem Relation Age of Onset    Cancer Mother 54        breast cancer; aggresive    Celiac disease Mother     Allergies Brother         gluten intol    Allergies Maternal Aunt         gluten intol    Celiac disease Sister     Thyroid cancer Neg Hx     Diabetes Neg Hx      Allergic rhinitis Neg Hx     Angioedema Neg Hx     Asthma Neg Hx     Eczema Neg Hx     Immunodeficiency Neg Hx     Urticaria Neg Hx     Rhinitis Neg Hx       Past Surgical History:   Procedure Laterality Date     SECTION, CLASSIC      ORIF FOOT FRACTURE      TUBAL LIGATION            Current Outpatient Medications:     citalopram (CELEXA) 20 MG tablet, Take 1 tablet by mouth every day., Disp: 90 tablet, Rfl: 1    FLUCELVAX QUAD 2254-3268, PF, 60 mcg (15 mcg x 4)/0.5 mL Syrg, PHARMACIST ADMINISTERED IMMUNIZATION ADMINISTERED AT TIME OF DISPENSING, Disp: , Rfl:     levothyroxine (SYNTHROID) 75 MCG tablet, TAKE 1 TABLET(75 MCG) BY MOUTH BEFORE BREAKFAST, Disp: 90 tablet, Rfl: 3    liothyronine (CYTOMEL) 5 MCG Tab, TAKE 1 TABLET BY MOUTH DAILY, Disp: 30 tablet, Rfl: 6     Review of patient's allergies indicates:  No Known Allergies     There were no vitals taken for this visit.  BP Readings from Last 3 Encounters:   21 118/80   21 112/82   21 (!) 128/59     Wt Readings from Last 3 Encounters:   21 1550 121.5 kg (267 lb 13.7 oz)   21 1304 122 kg (269 lb)   21 0809 122.3 kg (269 lb 10 oz)        PE:  GENERAL: Well developed, well nourished  CARDIOVASCULAR: Normal heart sounds, no pedal edema  RESPIRATORY: Normal effort,     Personally reviewed labs below:    Lab Results   Component Value Date     2022    K 4.1 2022     2022    CO2 23 2022    BUN 14 2022    CREATININE 0.7 2022    GLU 92 2022    HGBA1C 4.8 2015    AST 26 2022    ALT 25 2022    ALBUMIN 4.0 2022    ALBUMIN 4.4 2021    PROT 6.7 2022    BILITOT 0.4 2022    WBC 5.43 2022    HGB 12.8 2022    HCT 38.8 2022    MCV 91 2022    MCH 30.0 2022     2022    MPV 11.8 2022    GRAN 2.6 2022    GRAN 46.9 2022    LYMPH 2.0 2022    LYMPH 36.5 2022    CHOL 169  05/24/2022    HDL 57 05/24/2022    LDLCALC 95.2 05/24/2022    TRIG 84 05/24/2022     Lab Results   Component Value Date    TSH 3.138 05/24/2022    H4RCPAF 56 (L) 06/26/2021    K3LROCK 4.8 05/29/2017    FREET4 0.78 12/14/2021      Thyroid Labs Latest Ref Rng & Units 6/26/2021 12/14/2021 5/24/2022   TSH 0.400 - 4.000 uIU/mL 1.996 1.347 3.138   Free T4 0.71 - 1.51 ng/dL 0.92 0.78 -   Sodium 136 - 145 mmol/L 138 - 138   Potassium 3.5 - 5.1 mmol/L 4.0 - 4.1   Chloride 95 - 110 mmol/L 107 - 106   Carbon Dioxide 23 - 29 mmol/L 21(L) - 23   Glucose 70 - 110 mg/dL 88 - 92   Blood Urea Nitrogen 6 - 20 mg/dL 11 - 14   Creatinine 0.5 - 1.4 mg/dL 0.7 - 0.7   Calcium 8.7 - 10.5 mg/dL 9.2 - 8.9   Total Protein 6.0 - 8.4 g/dL 7.2 - 6.7   Albumin 3.5 - 5.2 g/dL 4.3 - 4.0   Total Bilirubin 0.1 - 1.0 mg/dL 0.5 - 0.4   AST 10 - 40 U/L 20 - 26   ALT 10 - 44 U/L 17 - 25   Anion Gap 8 - 16 mmol/L 10 - 9   eGFR (African American) >60 mL/min/1.73 m:2 >60.0 - >60.0   eGFR (Non-African American) >60 mL/min/1.73 m:2 >60.0 - >60.0   WBC 3.90 - 12.70 K/uL - - 5.43   RBC 4.00 - 5.40 M/uL - - 4.26   Hemoglobin 12.0 - 16.0 g/dL - - 12.8   Hematocrit 37.0 - 48.5 % - - 38.8   MCV 82 - 98 fL - - 91   MCH 27.0 - 31.0 pg - - 30.0   MCHC 32.0 - 36.0 g/dL - - 33.0   RDW 11.5 - 14.5 % - - 13.2   Platelets 150 - 450 K/uL - - 258   MPV 9.2 - 12.9 fL - - 11.8   Gran # 1.8 - 7.7 K/uL - - 2.6   Lymph # 1.0 - 4.8 K/uL - - 2.0   Mono # 0.3 - 1.0 K/uL - - 0.7   Eos # 0.0 - 0.5 K/uL - - 0.1   Baso # 0.00 - 0.20 K/uL - - 0.06   Gran % 38.0 - 73.0 % - - 46.9   Lymph % 18.0 - 48.0 % - - 36.5   Mono% 4.0 - 15.0 % - - 12.5   Eos % 0.0 - 8.0 % - - 2.6   Baso % 0.0 - 1.9 % - - 1.1   Thyroperoxidase Antibodies 0 - 6 IU/mL - - -       Hemoglobin A1C   Date Value Ref Range Status   01/27/2015 4.8 4.5 - 6.2 % Final     1. Hypothyroidism due to Hashimoto's thyroiditis  T4, Free    TSH    T4, Free    TSH    liothyronine (CYTOMEL) 5 MCG Tab    levothyroxine (SYNTHROID) 75 MCG  tablet      2. Hypovitaminosis D                 Hypothyroidism-TFTs today. Continue LT4 and LT3 doses. Thyroid was normal size last u/s. Will repeat if she has sx.  Hypovitaminosis d-check vd.       Additional labs approved by .     TSH, T4, vd-[place]   F/u in 1 year w/ labs prior

## 2022-12-22 ENCOUNTER — PATIENT MESSAGE (OUTPATIENT)
Dept: ENDOCRINOLOGY | Facility: CLINIC | Age: 42
End: 2022-12-22
Payer: COMMERCIAL

## 2022-12-28 DIAGNOSIS — E03.8 HYPOTHYROIDISM DUE TO HASHIMOTO'S THYROIDITIS: ICD-10-CM

## 2022-12-28 DIAGNOSIS — E06.3 HYPOTHYROIDISM DUE TO HASHIMOTO'S THYROIDITIS: ICD-10-CM

## 2022-12-28 RX ORDER — LEVOTHYROXINE SODIUM 75 UG/1
TABLET ORAL
Qty: 90 TABLET | Refills: 3 | Status: SHIPPED | OUTPATIENT
Start: 2022-12-28 | End: 2023-12-21 | Stop reason: ALTCHOICE

## 2022-12-30 ENCOUNTER — LAB VISIT (OUTPATIENT)
Dept: LAB | Facility: HOSPITAL | Age: 42
End: 2022-12-30
Attending: PHYSICIAN ASSISTANT
Payer: COMMERCIAL

## 2022-12-30 DIAGNOSIS — E06.3 HYPOTHYROIDISM DUE TO HASHIMOTO'S THYROIDITIS: ICD-10-CM

## 2022-12-30 DIAGNOSIS — E55.9 HYPOVITAMINOSIS D: ICD-10-CM

## 2022-12-30 DIAGNOSIS — E03.8 HYPOTHYROIDISM DUE TO HASHIMOTO'S THYROIDITIS: ICD-10-CM

## 2022-12-30 LAB
25(OH)D3+25(OH)D2 SERPL-MCNC: 42 NG/ML (ref 30–96)
BCS RECOMMENDATION EXT: NORMAL
T4 FREE SERPL-MCNC: 0.79 NG/DL (ref 0.71–1.51)
TSH SERPL DL<=0.005 MIU/L-ACNC: 2.26 UIU/ML (ref 0.4–4)

## 2022-12-30 PROCEDURE — 84443 ASSAY THYROID STIM HORMONE: CPT | Performed by: PHYSICIAN ASSISTANT

## 2022-12-30 PROCEDURE — 36415 COLL VENOUS BLD VENIPUNCTURE: CPT | Mod: PO | Performed by: PHYSICIAN ASSISTANT

## 2022-12-30 PROCEDURE — 84439 ASSAY OF FREE THYROXINE: CPT | Performed by: PHYSICIAN ASSISTANT

## 2022-12-30 PROCEDURE — 82306 VITAMIN D 25 HYDROXY: CPT | Performed by: PHYSICIAN ASSISTANT

## 2023-03-06 DIAGNOSIS — F32.A DEPRESSION, UNSPECIFIED DEPRESSION TYPE: ICD-10-CM

## 2023-03-06 RX ORDER — CITALOPRAM 20 MG/1
20 TABLET, FILM COATED ORAL DAILY
Qty: 90 TABLET | Refills: 0 | Status: CANCELLED | OUTPATIENT
Start: 2023-03-06

## 2023-05-31 ENCOUNTER — PATIENT MESSAGE (OUTPATIENT)
Dept: FAMILY MEDICINE | Facility: CLINIC | Age: 43
End: 2023-05-31
Payer: COMMERCIAL

## 2023-05-31 DIAGNOSIS — E03.8 HYPOTHYROIDISM DUE TO HASHIMOTO'S THYROIDITIS: ICD-10-CM

## 2023-05-31 DIAGNOSIS — E06.3 HYPOTHYROIDISM DUE TO HASHIMOTO'S THYROIDITIS: ICD-10-CM

## 2023-06-04 ENCOUNTER — PATIENT MESSAGE (OUTPATIENT)
Dept: FAMILY MEDICINE | Facility: CLINIC | Age: 43
End: 2023-06-04
Payer: COMMERCIAL

## 2023-06-04 DIAGNOSIS — F32.A DEPRESSION, UNSPECIFIED DEPRESSION TYPE: ICD-10-CM

## 2023-06-05 RX ORDER — CITALOPRAM 20 MG/1
20 TABLET, FILM COATED ORAL DAILY
Qty: 30 TABLET | Refills: 0 | Status: SHIPPED | OUTPATIENT
Start: 2023-06-05 | End: 2023-07-06

## 2023-06-15 ENCOUNTER — TELEPHONE (OUTPATIENT)
Dept: FAMILY MEDICINE | Facility: CLINIC | Age: 43
End: 2023-06-15
Payer: COMMERCIAL

## 2023-06-15 NOTE — TELEPHONE ENCOUNTER
Tried to call pt to let her know that we had to reschedule her appointment due to Sonia not being in clinic on June 19th.

## 2023-07-06 ENCOUNTER — TELEPHONE (OUTPATIENT)
Dept: FAMILY MEDICINE | Facility: CLINIC | Age: 43
End: 2023-07-06
Payer: COMMERCIAL

## 2023-07-06 ENCOUNTER — PATIENT MESSAGE (OUTPATIENT)
Dept: FAMILY MEDICINE | Facility: CLINIC | Age: 43
End: 2023-07-06
Payer: COMMERCIAL

## 2023-07-06 DIAGNOSIS — F32.A DEPRESSION, UNSPECIFIED DEPRESSION TYPE: ICD-10-CM

## 2023-07-06 RX ORDER — CITALOPRAM 20 MG/1
20 TABLET, FILM COATED ORAL DAILY
Qty: 30 TABLET | Refills: 0 | Status: SHIPPED | OUTPATIENT
Start: 2023-07-06 | End: 2023-07-17

## 2023-07-06 NOTE — TELEPHONE ENCOUNTER
I have not seen this patient in over 4 years.  She needs to see me and not Sonia.  Cancel with Sonia on 7/17 and put her with me in my 15 min daily change in that morning.  I have sent in 30 pills.

## 2023-07-11 DIAGNOSIS — E06.3 HYPOTHYROIDISM DUE TO HASHIMOTO'S THYROIDITIS: ICD-10-CM

## 2023-07-11 DIAGNOSIS — E03.8 HYPOTHYROIDISM DUE TO HASHIMOTO'S THYROIDITIS: ICD-10-CM

## 2023-07-11 RX ORDER — LIOTHYRONINE SODIUM 5 UG/1
TABLET ORAL
Qty: 30 TABLET | Refills: 6 | Status: SHIPPED | OUTPATIENT
Start: 2023-07-11 | End: 2024-02-20

## 2023-07-17 ENCOUNTER — PATIENT OUTREACH (OUTPATIENT)
Dept: ADMINISTRATIVE | Facility: HOSPITAL | Age: 43
End: 2023-07-17
Payer: COMMERCIAL

## 2023-07-17 ENCOUNTER — OFFICE VISIT (OUTPATIENT)
Dept: FAMILY MEDICINE | Facility: CLINIC | Age: 43
End: 2023-07-17
Payer: COMMERCIAL

## 2023-07-17 VITALS
HEART RATE: 70 BPM | OXYGEN SATURATION: 96 % | BODY MASS INDEX: 41.31 KG/M2 | DIASTOLIC BLOOD PRESSURE: 68 MMHG | HEIGHT: 69 IN | WEIGHT: 278.88 LBS | SYSTOLIC BLOOD PRESSURE: 122 MMHG

## 2023-07-17 DIAGNOSIS — Z00.00 ROUTINE PHYSICAL EXAMINATION: Primary | ICD-10-CM

## 2023-07-17 DIAGNOSIS — F32.A DEPRESSION, UNSPECIFIED DEPRESSION TYPE: ICD-10-CM

## 2023-07-17 DIAGNOSIS — E03.4 HYPOTHYROIDISM DUE TO ACQUIRED ATROPHY OF THYROID: ICD-10-CM

## 2023-07-17 DIAGNOSIS — F32.0 CURRENT MILD EPISODE OF MAJOR DEPRESSIVE DISORDER WITHOUT PRIOR EPISODE: ICD-10-CM

## 2023-07-17 PROCEDURE — 99396 PREV VISIT EST AGE 40-64: CPT | Mod: S$GLB,,, | Performed by: INTERNAL MEDICINE

## 2023-07-17 PROCEDURE — 99999 PR PBB SHADOW E&M-EST. PATIENT-LVL III: ICD-10-PCS | Mod: PBBFAC,,, | Performed by: INTERNAL MEDICINE

## 2023-07-17 PROCEDURE — 99396 PR PREVENTIVE VISIT,EST,40-64: ICD-10-PCS | Mod: S$GLB,,, | Performed by: INTERNAL MEDICINE

## 2023-07-17 PROCEDURE — 99999 PR PBB SHADOW E&M-EST. PATIENT-LVL III: CPT | Mod: PBBFAC,,, | Performed by: INTERNAL MEDICINE

## 2023-07-17 RX ORDER — CITALOPRAM 20 MG/1
20 TABLET, FILM COATED ORAL DAILY
Qty: 90 TABLET | Refills: 3 | Status: SHIPPED | OUTPATIENT
Start: 2023-07-17

## 2023-07-17 NOTE — PROGRESS NOTES
Subjective:       Patient ID: Jimena Lezama is a 42 y.o. female.  Chief Complaint: Annual Exam     HPI    Here for routine health maintenance.  Alternates with [facility].    Depression - controlled; us    Hypothyroid - controlled, endocrine does labs.   GyneDr Garcia does mammorgrams, testosterone replacement.         Assessment:       1. Routine physical examination    2. Hypothyroidism due to acquired atrophy of thyroid    3. Current mild episode of major depressive disorder without prior episode    4. Depression, unspecified depression type        Plan:       Routine physical examination    Hypothyroidism due to acquired atrophy of thyroid    Current mild episode of major depressive disorder without prior episode    Depression, unspecified depression type  -     citalopram (CELEXA) 20 MG tablet; Take 1 tablet (20 mg total) by mouth once daily.  Dispense: 90 tablet; Refill: 3            Continue current management and monitor.  Other diagnoses were reviewed and found stable and will continue to monitor.  Counseled on regular exercise, maintenance of a healthy weight, balanced diet rich in fruits/vegetables and lean protein, and avoidance of unhealthy habits like smoking and excessive alcohol intake.   Also, counseled on importance of being compliant with medication, health appointments, diet and exercise.     Follow up in about 1 year (around 7/17/2024). Alternates with [facility] for annual       Medication List with Changes/Refills   Current Medications    LEVOTHYROXINE (SYNTHROID) 75 MCG TABLET    Take 1 tablet by mouth daily before breakfast.    LIOTHYRONINE (CYTOMEL) 5 MCG TAB    TAKE 1 TABLET BY MOUTH DAILY   Changed and/or Refilled Medications    Modified Medication Previous Medication    CITALOPRAM (CELEXA) 20 MG TABLET citalopram (CELEXA) 20 MG tablet       Take 1 tablet (20 mg total) by mouth once daily.    Take 1 tablet (20 mg total) by mouth once daily.   Discontinued Medications     FLUCELVAX QUAD 3323-7266, PF, 60 MCG (15 MCG X 4)/0.5 ML SYRG    PHARMACIST ADMINISTERED IMMUNIZATION ADMINISTERED AT TIME OF DISPENSING       BP Readings from Last 3 Encounters:   07/17/23 122/68   12/21/21 118/80   06/21/21 112/82     Hemoglobin A1C   Date Value Ref Range Status   01/27/2015 4.8 4.5 - 6.2 % Final     Lab Results   Component Value Date    TSH 2.264 12/30/2022     Lab Results   Component Value Date    LDLCALC 95.2 05/24/2022    LDLCALC 112.8 12/19/2019    LDLCALC 109.8 12/22/2017     Lab Results   Component Value Date    TRIG 84 05/24/2022    TRIG 56 12/19/2019    TRIG 66 12/22/2017     Wt Readings from Last 3 Encounters:   07/17/23 126.5 kg (278 lb 14.1 oz)   12/21/21 121.5 kg (267 lb 13.7 oz)   09/27/21 122 kg (269 lb)     Lab Results   Component Value Date    HGB 12.8 05/24/2022    HCT 38.8 05/24/2022    WBC 5.43 05/24/2022    ALT 25 05/24/2022    AST 26 05/24/2022     05/24/2022    K 4.1 05/24/2022    CREATININE 0.7 05/24/2022           Review of Systems   Constitutional:  Negative for diaphoresis and fever.   HENT:  Negative for drooling and nosebleeds.    Eyes:  Negative for discharge and redness.   Respiratory:  Negative for apnea and choking.    Cardiovascular:  Negative for chest pain and palpitations.   Gastrointestinal:  Negative for abdominal pain and nausea.   Skin:  Negative for color change.   Neurological:  Negative for seizures and syncope.   Psychiatric/Behavioral:  Negative for behavioral problems.          Objective:      Vitals:    07/17/23 0834   BP: 122/68   Pulse: 70     Physical Exam  Vitals reviewed.   Eyes:      Conjunctiva/sclera: Conjunctivae normal.   Neck:      Thyroid: No thyromegaly.      Trachea: Trachea normal.   Cardiovascular:      Rate and Rhythm: Normal rate and regular rhythm.      Comments: Edema negative  Pulmonary:      Effort: Pulmonary effort is normal.      Breath sounds: Normal breath sounds.   Abdominal:      General: Bowel sounds are normal.       Palpations: Abdomen is soft. There is no hepatomegaly.   Musculoskeletal:      Cervical back: Normal range of motion.      Comments: ROM normal bilateral  Strength normal bilateral   Skin:     General: Skin is warm and dry.   Neurological:      Deep Tendon Reflexes: Reflexes are normal and symmetric.   Psychiatric:      Comments: Alert and Oriented

## 2023-07-17 NOTE — PROGRESS NOTES
Population Health Chart Review & Patient Outreach Details:     Reason for Outreach Encounter:     [x]  Non-Compliant Report   []  Payor Report ([employer], PHN, BCBS, MSSP, MCIP, UHC, etc.)   []  Pre-Visit Chart Review     Updates Requested / Reviewed:     []  Care Everywhere    []     [x]  External Sources ([facility], [facility], DIS, etc.)   []  Care Team Updated    Patient Outreach Method:    []  Telephone Outreach Completed   [] Successful   [] Left Voicemail   [] Unable to Contact (wrong number, no voicemail)  []  [employer] Portal Outreach Sent  []  Letter Outreach Mailed  []  Fax Sent for External Records  [x]  External Records Upload    Health Maintenance Topics Addressed and Outreach Outcomes / Actions Taken:        [x]      Breast Cancer Screening []  Mammo Scheduled      [x]  External Records Requested     []  Added Reminder to Complete to Upcoming Primary Care Appt Notes     []  Patient Declined     []  Patient Will Call Back to Schedule     []  Patient Will Schedule with External Provider / Order Routed if Applicable             []       Cervical Cancer Screening []  Pap Scheduled      []  External Records Requested     []  Added Reminder to Complete to Upcoming Primary Care Appt Notes     []  Patient Declined     []  Patient Will Call Back to Schedule     []  Patient Will Schedule with External Provider               []          Colorectal Cancer Screening []  Colonoscopy Case Request or Referral Placed     []  External Records Requested     []  Added Reminder to Complete to Upcoming Primary Care Appt Notes     []  Patient Declined     []  Patient Will Call Back to Schedule     []  Patient Will Schedule with External Provider     []  Fit Kit Mailed (add the SmartPhrase under additional notes)     []  Reminded Patient to Complete Home Test             []      Diabetic Eye Exam []  Eye Camera Scheduled or Optometry Referral Placed     []  External Records Requested     []  Added Reminder to Complete to  Upcoming Primary Care Appt Notes     []  Patient Declined     []  Patient Will Call Back to Schedule     []  Patient Will Schedule with External Provider             []      Blood Pressure Control []  Primary Care Follow Up Visit Scheduled     []  Remote Blood Pressure Reading Captured     []  Added Reminder to Complete to Upcoming Primary Care Appt Notes     []  Patient Declined     []  Patient Will Call Back / Patient Will Send Portal Message with Reading     []  Patient Will Call Back to Schedule Provider Visit             []       HbA1c & Other Labs []  Lab Appt Scheduled for Due Labs     []  Primary Care Follow Up Visit Scheduled      []  Reminded Patient to Complete Home Test     []  Added Reminder to Complete to Upcoming Primary Care Appt Notes     []  Patient Declined     []  Patient Will Call Back to Schedule     []  Patient Will Schedule with External Provider / Order Routed if Applicable           []    Schedule Primary Care Appt []  Primary Care Appt Scheduled     []  Patient Declined     []  Patient Will Call Back to Schedule     []  Pt Established with External Provider & Updated Care Team             []      Medication Adherence []  Primary Care Appointment Scheduled     []  Added Reminder to Upcoming Primary Care Appt Notes     []  Patient Reminded to  Prescription     []  Patient Declined, Provider Notified if Needed     []  Sent Provider Message to Review and/or Add Exclusion to Problem List             []      Osteoporosis Screening []  DXA Appointment Scheduled     []  External Records Requested     []  Added Reminder to Complete to Upcoming Primary Care Appt Notes     []  Patient Declined     []  Patient Will Call Back to Schedule     []  Patient Will Schedule with External Provider / Order Routed if Applicable     Additional Care Coordinator Notes:         Further Action Needed If Patient Returns Outreach:

## 2023-12-15 ENCOUNTER — PATIENT MESSAGE (OUTPATIENT)
Dept: FAMILY MEDICINE | Facility: CLINIC | Age: 43
End: 2023-12-15
Payer: COMMERCIAL

## 2023-12-15 DIAGNOSIS — Z12.11 SCREENING FOR COLORECTAL CANCER: Primary | ICD-10-CM

## 2023-12-15 DIAGNOSIS — Z12.12 SCREENING FOR COLORECTAL CANCER: Primary | ICD-10-CM

## 2023-12-16 ENCOUNTER — PATIENT MESSAGE (OUTPATIENT)
Dept: ENDOCRINOLOGY | Facility: CLINIC | Age: 43
End: 2023-12-16
Payer: COMMERCIAL

## 2023-12-19 ENCOUNTER — TELEPHONE (OUTPATIENT)
Dept: ENDOCRINOLOGY | Facility: CLINIC | Age: 43
End: 2023-12-19
Payer: COMMERCIAL

## 2023-12-19 ENCOUNTER — LAB VISIT (OUTPATIENT)
Dept: LAB | Facility: HOSPITAL | Age: 43
End: 2023-12-19
Payer: COMMERCIAL

## 2023-12-19 DIAGNOSIS — E03.8 HYPOTHYROIDISM DUE TO HASHIMOTO'S THYROIDITIS: ICD-10-CM

## 2023-12-19 DIAGNOSIS — E06.3 HYPOTHYROIDISM DUE TO HASHIMOTO'S THYROIDITIS: ICD-10-CM

## 2023-12-19 LAB
T4 FREE SERPL-MCNC: 0.65 NG/DL (ref 0.71–1.51)
TSH SERPL DL<=0.005 MIU/L-ACNC: 2.06 UIU/ML (ref 0.4–4)

## 2023-12-19 PROCEDURE — 36415 COLL VENOUS BLD VENIPUNCTURE: CPT | Mod: PO | Performed by: PHYSICIAN ASSISTANT

## 2023-12-19 PROCEDURE — 84439 ASSAY OF FREE THYROXINE: CPT | Performed by: PHYSICIAN ASSISTANT

## 2023-12-19 PROCEDURE — 84443 ASSAY THYROID STIM HORMONE: CPT | Performed by: PHYSICIAN ASSISTANT

## 2023-12-21 RX ORDER — LEVOTHYROXINE SODIUM 88 UG/1
88 TABLET ORAL
Qty: 30 TABLET | Refills: 11 | Status: SHIPPED | OUTPATIENT
Start: 2023-12-21 | End: 2023-12-26 | Stop reason: SDUPTHER

## 2023-12-26 DIAGNOSIS — E06.3 HYPOTHYROIDISM DUE TO HASHIMOTO'S THYROIDITIS: Primary | ICD-10-CM

## 2023-12-26 DIAGNOSIS — E03.8 HYPOTHYROIDISM DUE TO HASHIMOTO'S THYROIDITIS: Primary | ICD-10-CM

## 2023-12-26 RX ORDER — LEVOTHYROXINE SODIUM 88 UG/1
88 TABLET ORAL
Qty: 30 TABLET | Refills: 6 | Status: SHIPPED | OUTPATIENT
Start: 2023-12-26 | End: 2024-01-31

## 2024-01-02 ENCOUNTER — TELEPHONE (OUTPATIENT)
Dept: FAMILY MEDICINE | Facility: CLINIC | Age: 44
End: 2024-01-02
Payer: COMMERCIAL

## 2024-01-02 DIAGNOSIS — Z00.00 ROUTINE PHYSICAL EXAMINATION: Primary | ICD-10-CM

## 2024-01-02 LAB — BCS RECOMMENDATION EXT: NORMAL

## 2024-01-02 NOTE — TELEPHONE ENCOUNTER
Dr. Office called for c-scope referral       Can you assist with the referral to gastro that was requested on 12/15/23 and Sole states it was taken care of but I do not see the request.    Please assist.

## 2024-01-02 NOTE — TELEPHONE ENCOUNTER
----- Message from Verónicaamanda Díaz sent at 1/2/2024 10:39 AM CST -----  Contact: [facility]  Type:  Needs Medical Advice    Who Called: Paris kellogg/[facility]  Best Call Back Number:  [phone] Fax: [phone] or [phone]  Additional Information:  Need referral for C-scope for pt...   Please call or fax referral... Thank you...

## 2024-01-18 LAB — CRC RECOMMENDATION EXT: NORMAL

## 2024-01-22 ENCOUNTER — PATIENT OUTREACH (OUTPATIENT)
Dept: ADMINISTRATIVE | Facility: HOSPITAL | Age: 44
End: 2024-01-22
Payer: COMMERCIAL

## 2024-01-22 NOTE — PROGRESS NOTES
Population Health Chart Review & Patient Outreach Details    Outreach Performed: NO    Additional Pop Health Notes:           Updates Requested / Reviewed:      Updated Care Coordination Note         Health Maintenance Topics Overdue:    Health Maintenance Due   Topic Date Due    Hemoglobin A1c (Diabetic Prevention Screening)  01/27/2018    COVID-19 Vaccine (2 - 2023-24 season) 09/01/2023    Mammogram  12/30/2023         Health Maintenance Topic(s) Outreach Outcomes & Actions Taken:    Colorectal Cancer Screening - Outreach Outcomes & Actions Taken  : External Records Uploaded, Care Team Updated, & History Updated if Applicable

## 2024-01-29 ENCOUNTER — PATIENT MESSAGE (OUTPATIENT)
Dept: ENDOCRINOLOGY | Facility: CLINIC | Age: 44
End: 2024-01-29
Payer: COMMERCIAL

## 2024-01-30 ENCOUNTER — LAB VISIT (OUTPATIENT)
Dept: LAB | Facility: HOSPITAL | Age: 44
End: 2024-01-30
Attending: PHYSICIAN ASSISTANT
Payer: COMMERCIAL

## 2024-01-30 DIAGNOSIS — E03.8 HYPOTHYROIDISM DUE TO HASHIMOTO'S THYROIDITIS: ICD-10-CM

## 2024-01-30 DIAGNOSIS — E06.3 HYPOTHYROIDISM DUE TO HASHIMOTO'S THYROIDITIS: ICD-10-CM

## 2024-01-30 LAB
T4 FREE SERPL-MCNC: 0.78 NG/DL (ref 0.71–1.51)
TSH SERPL DL<=0.005 MIU/L-ACNC: 1.48 UIU/ML (ref 0.4–4)

## 2024-01-30 PROCEDURE — 84443 ASSAY THYROID STIM HORMONE: CPT | Performed by: PHYSICIAN ASSISTANT

## 2024-01-30 PROCEDURE — 84439 ASSAY OF FREE THYROXINE: CPT | Performed by: PHYSICIAN ASSISTANT

## 2024-01-30 PROCEDURE — 36415 COLL VENOUS BLD VENIPUNCTURE: CPT | Mod: PO | Performed by: PHYSICIAN ASSISTANT

## 2024-01-31 ENCOUNTER — OFFICE VISIT (OUTPATIENT)
Dept: ENDOCRINOLOGY | Facility: CLINIC | Age: 44
End: 2024-01-31
Payer: COMMERCIAL

## 2024-01-31 ENCOUNTER — PATIENT MESSAGE (OUTPATIENT)
Dept: ENDOCRINOLOGY | Facility: CLINIC | Age: 44
End: 2024-01-31

## 2024-01-31 DIAGNOSIS — E55.9 HYPOVITAMINOSIS D: ICD-10-CM

## 2024-01-31 DIAGNOSIS — E03.8 HYPOTHYROIDISM DUE TO HASHIMOTO'S THYROIDITIS: Primary | ICD-10-CM

## 2024-01-31 DIAGNOSIS — E06.3 HYPOTHYROIDISM DUE TO HASHIMOTO'S THYROIDITIS: Primary | ICD-10-CM

## 2024-01-31 DIAGNOSIS — E66.9 OBESITY (BMI 30-39.9): ICD-10-CM

## 2024-01-31 PROCEDURE — 99214 OFFICE O/P EST MOD 30 MIN: CPT | Mod: 95,,, | Performed by: PHYSICIAN ASSISTANT

## 2024-01-31 RX ORDER — DEXAMETHASONE 1 MG/1
TABLET ORAL
Qty: 1 TABLET | Refills: 0 | Status: SHIPPED | OUTPATIENT
Start: 2024-01-31

## 2024-01-31 RX ORDER — LEVOTHYROXINE SODIUM 100 UG/1
100 TABLET ORAL
Qty: 30 TABLET | Refills: 11 | Status: SHIPPED | OUTPATIENT
Start: 2024-01-31 | End: 2025-01-30

## 2024-01-31 NOTE — PROGRESS NOTES
Patient ID:  Jimena Lezama is a 43 y.o. female.    The patient location is: Home  The chief complaint leading to consultation is: Hypothyroidism    Visit type: audiovisual    Face to Face time with patient: 8 min  10 minutes of total time spent on the encounter, which includes face to face time and non-face to face time preparing to see the patient (eg, review of tests), Obtaining and/or reviewing separately obtained history, Documenting clinical information in the electronic or other health record, Independently interpreting results (not separately reported) and communicating results to the patient/family/caregiver, or Care coordination (not separately reported).     Each patient to whom he or she provides medical services by telemedicine is:  (1) informed of the relationship between the physician and patient and the respective role of any other health care provider with respect to management of the patient; and (2) notified that he or she may decline to receive medical services by telemedicine and may withdraw from such care at any time.    Chief Complaint:  Hypothyroidism      Pt presents to follow up for hypothyroidism.      Overview: Onset of Hashimoto's was in 2008. Notes symptoms occurred after having kids. Had 4 kids in 5 years, and last had twins. Has been on Levothyroxine 75 mcg daily for the last 10 years. Notes history of anxiety and has been taking Celexa. Previously saw Dr. Sandifer and Dr. Robles.  She is using testosterone pellets from her GYN w/out estrogen.     Hypothyroidism  LT4 88 mcg qd  Lt3 5 mcg daily    + fatigue, hair loss, heat intolerance, wt loss.     Denies in memory changes, constipation, cold intolerance.     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 Her brother has hypothyroidism.   Works out 5 days a week. Pt has not lost much wt even after working out for a long period of time.    Seeing a nutritionist. Takes supplements magnesium, glucosamine/chronditin, probiotics, vitamin D, biotin,  and collagen.  +TPO in the past.    Has restless legs at night. Notes low BP and low heart rate. Notes mom has celiac and sister has gluten sensitivity.    Reports controlled reactive hypoglycemia. Reports low libido.     Denies voice changes, hoarseness or history of head/neck radiation. Had a thyroid US 10 years ago that showed goiter but no nodules.     Thyroid u/s 5/22 did not show any nodules and normal sized thyroid. Cycles are regular. Taking vd.     Has nutritionist and is doing well with diet.      Weighs 275 lbs today.   Wt Readings from Last 6 Encounters:   07/17/23 126.5 kg (278 lb 14.1 oz)   12/21/21 121.5 kg (267 lb 13.7 oz)   09/27/21 122 kg (269 lb)   06/21/21 122.3 kg (269 lb 10 oz)   01/16/21 119.7 kg (264 lb)   10/12/20 119.4 kg (263 lb 3.7 oz)      Review of Systems   Constitutional:  Negative for diaphoresis, fatigue and unexpected weight change.   HENT:  Negative for trouble swallowing and voice change.    Eyes:  Negative for visual disturbance.   Respiratory:  Negative for shortness of breath.    Cardiovascular:  Negative for chest pain.   Gastrointestinal:  Negative for abdominal pain, constipation, diarrhea, nausea and vomiting.   Endocrine: Negative for cold intolerance and heat intolerance.   Musculoskeletal:  Negative for myalgias.   Skin:  Negative for wound.   Neurological:  Negative for headaches.   Hematological:  Negative for adenopathy.   Psychiatric/Behavioral:  Negative for sleep disturbance.         Past Medical History:   Diagnosis Date    Anxiety     Hypothyroid     Irritable bowel     Obesity       Social History     Tobacco Use    Smoking status: Never    Smokeless tobacco: Never   Substance Use Topics    Alcohol use: Yes     Comment: social    Drug use: No     Family History   Problem Relation Age of Onset    Cancer Mother 54        breast cancer; aggresive    Celiac disease Mother     Allergies Brother         gluten intol    Allergies Maternal Aunt         gluten intol     Celiac disease Sister     Thyroid cancer Neg Hx     Diabetes Neg Hx     Allergic rhinitis Neg Hx     Angioedema Neg Hx     Asthma Neg Hx     Eczema Neg Hx     Immunodeficiency Neg Hx     Urticaria Neg Hx     Rhinitis Neg Hx       Past Surgical History:   Procedure Laterality Date     SECTION, CLASSIC      ORIF FOOT FRACTURE      TUBAL LIGATION            Current Outpatient Medications:     citalopram (CELEXA) 20 MG tablet, Take 1 tablet (20 mg total) by mouth once daily., Disp: 90 tablet, Rfl: 3    levothyroxine (SYNTHROID) 88 MCG tablet, Take 1 tablet (88 mcg total) by mouth before breakfast., Disp: 30 tablet, Rfl: 6    liothyronine (CYTOMEL) 5 MCG Tab, TAKE 1 TABLET BY MOUTH DAILY, Disp: 30 tablet, Rfl: 6     Review of patient's allergies indicates:  No Known Allergies     There were no vitals taken for this visit.  BP Readings from Last 3 Encounters:   23 122/68   21 118/80   21 112/82     Wt Readings from Last 3 Encounters:   23 0834 126.5 kg (278 lb 14.1 oz)   21 1550 121.5 kg (267 lb 13.7 oz)   21 1304 122 kg (269 lb)        PE:  GENERAL: Well developed, well nourished  CARDIOVASCULAR: Normal heart sounds, no pedal edema  RESPIRATORY: Normal effort,     Personally reviewed labs below:    Lab Results   Component Value Date     2022    K 4.1 2022     2022    CO2 23 2022    BUN 14 2022    CREATININE 0.7 2022    GLU 92 2022    HGBA1C 4.8 2015    AST 26 2022    ALT 25 2022    ALBUMIN 4.0 2022    ALBUMIN 4.4 2021    PROT 6.7 2022    BILITOT 0.4 2022    WBC 5.43 2022    HGB 12.8 2022    HCT 38.8 2022    MCV 91 2022    MCH 30.0 2022    MCHC 33.0 2022    RDW 13.2 2022     2022    MPV 11.8 2022    GRAN 2.6 2022    GRAN 46.9 2022    LYMPH 2.0 2022    LYMPH 36.5 2022    CHOL 169 2022    HDL 57  05/24/2022    LDLCALC 95.2 05/24/2022    TRIG 84 05/24/2022     Lab Results   Component Value Date    TSH 1.477 01/30/2024    X7ROCRV 56 (L) 06/26/2021    C8TUHQU 4.8 05/29/2017    FREET4 0.78 01/30/2024          Latest Ref Rng & Units 12/30/2022    11:52 AM 12/19/2023     7:47 AM 1/30/2024     1:49 PM   Thyroid Labs   TSH 0.400 - 4.000 uIU/mL 2.264  2.060  1.477    Free T4 0.71 - 1.51 ng/dL 0.79  0.65  0.78        Hemoglobin A1C   Date Value Ref Range Status   01/27/2015 4.8 4.5 - 6.2 % Final     1. Hypothyroidism due to Hashimoto's thyroiditis  levothyroxine (SYNTHROID) 100 MCG tablet    T4, Free    TSH    T4, Free    TSH      2. Hypovitaminosis D  Vitamin D      3. Obesity (BMI 30-39.9)  ACTH    Cortisol    dexAMETHasone (DECADRON) 1 MG Tab            Hypothyroidism-T4 still low normal. TSH is wnl. Increase LT4 to 100 mcg qd. Continue LT3 5 mcg qd. Thyroid was normal size last u/s. Will repeat if she has sx.  Hypovitaminosis d-check vd.   Obesity-There is no height or weight on file to calculate BMI.   R/o cushings.     Additional labs approved by .     Acth, cortisol 8 am  TSH, T4, vd-[place] -six weeks  F/u in 1 year w/ labs prior

## 2024-02-05 ENCOUNTER — LAB VISIT (OUTPATIENT)
Dept: LAB | Facility: HOSPITAL | Age: 44
End: 2024-02-05
Attending: PHYSICIAN ASSISTANT
Payer: COMMERCIAL

## 2024-02-05 DIAGNOSIS — E66.9 OBESITY (BMI 30-39.9): ICD-10-CM

## 2024-02-05 LAB — CORTIS SERPL-MCNC: <1 UG/DL

## 2024-02-05 PROCEDURE — 82024 ASSAY OF ACTH: CPT | Performed by: PHYSICIAN ASSISTANT

## 2024-02-05 PROCEDURE — 82533 TOTAL CORTISOL: CPT | Performed by: PHYSICIAN ASSISTANT

## 2024-02-06 LAB — ACTH PLAS-MCNC: 25 PG/ML (ref 0–46)

## 2024-02-07 ENCOUNTER — PATIENT MESSAGE (OUTPATIENT)
Dept: ENDOCRINOLOGY | Facility: CLINIC | Age: 44
End: 2024-02-07
Payer: COMMERCIAL

## 2024-02-20 DIAGNOSIS — E06.3 HYPOTHYROIDISM DUE TO HASHIMOTO'S THYROIDITIS: ICD-10-CM

## 2024-02-20 DIAGNOSIS — E03.8 HYPOTHYROIDISM DUE TO HASHIMOTO'S THYROIDITIS: ICD-10-CM

## 2024-02-20 RX ORDER — LIOTHYRONINE SODIUM 5 UG/1
TABLET ORAL
Qty: 90 TABLET | Refills: 3 | Status: SHIPPED | OUTPATIENT
Start: 2024-02-20

## 2024-07-28 DIAGNOSIS — F32.A DEPRESSION, UNSPECIFIED DEPRESSION TYPE: ICD-10-CM

## 2024-07-28 NOTE — TELEPHONE ENCOUNTER
Care Due:                  Date            Visit Type   Department     Provider  --------------------------------------------------------------------------------                                SAME DAY -                              ESTABLISHED   [facility]  Last Visit: 07-      PATIENT      MEDICINE       SARAH ALCAZAR  Next Visit: None Scheduled  None         None Found                                                            Last  Test          Frequency    Reason                     Performed    Due Date  --------------------------------------------------------------------------------    Office Visit  15 months..  citalopram...............  07-   10-    [facility] Embedded Care Due Messages. Reference number: [id number].   7/28/2024 3:34:30 AM CDT

## 2024-07-29 ENCOUNTER — LAB VISIT (OUTPATIENT)
Dept: LAB | Facility: HOSPITAL | Age: 44
End: 2024-07-29
Attending: NURSE PRACTITIONER
Payer: COMMERCIAL

## 2024-07-29 ENCOUNTER — OFFICE VISIT (OUTPATIENT)
Dept: FAMILY MEDICINE | Facility: CLINIC | Age: 44
End: 2024-07-29
Payer: COMMERCIAL

## 2024-07-29 ENCOUNTER — PATIENT MESSAGE (OUTPATIENT)
Dept: FAMILY MEDICINE | Facility: CLINIC | Age: 44
End: 2024-07-29

## 2024-07-29 VITALS
SYSTOLIC BLOOD PRESSURE: 116 MMHG | TEMPERATURE: 98 F | DIASTOLIC BLOOD PRESSURE: 78 MMHG | BODY MASS INDEX: 43.16 KG/M2 | HEART RATE: 68 BPM | HEIGHT: 69 IN | WEIGHT: 291.44 LBS | OXYGEN SATURATION: 98 %

## 2024-07-29 DIAGNOSIS — Z00.00 PREVENTATIVE HEALTH CARE: Primary | ICD-10-CM

## 2024-07-29 DIAGNOSIS — Z00.00 PREVENTATIVE HEALTH CARE: ICD-10-CM

## 2024-07-29 DIAGNOSIS — E66.01 MORBID OBESITY WITH BMI OF 40.0-44.9, ADULT: ICD-10-CM

## 2024-07-29 DIAGNOSIS — R11.0 NAUSEA: ICD-10-CM

## 2024-07-29 DIAGNOSIS — F32.A DEPRESSION, UNSPECIFIED DEPRESSION TYPE: ICD-10-CM

## 2024-07-29 PROBLEM — Z20.828 EXPOSURE TO SARS-ASSOCIATED CORONAVIRUS: Status: RESOLVED | Noted: 2021-01-16 | Resolved: 2024-07-29

## 2024-07-29 LAB
ALBUMIN SERPL BCP-MCNC: 4.3 G/DL (ref 3.5–5.2)
ALP SERPL-CCNC: 79 U/L (ref 55–135)
ALT SERPL W/O P-5'-P-CCNC: 32 U/L (ref 10–44)
ANION GAP SERPL CALC-SCNC: 7 MMOL/L (ref 8–16)
AST SERPL-CCNC: 28 U/L (ref 10–40)
BASOPHILS # BLD AUTO: 0.06 K/UL (ref 0–0.2)
BASOPHILS NFR BLD: 0.9 % (ref 0–1.9)
BILIRUB SERPL-MCNC: 0.3 MG/DL (ref 0.1–1)
BUN SERPL-MCNC: 13 MG/DL (ref 6–20)
CALCIUM SERPL-MCNC: 9.6 MG/DL (ref 8.7–10.5)
CHLORIDE SERPL-SCNC: 105 MMOL/L (ref 95–110)
CHOLEST SERPL-MCNC: 206 MG/DL (ref 120–199)
CHOLEST/HDLC SERPL: 3.7 {RATIO} (ref 2–5)
CO2 SERPL-SCNC: 26 MMOL/L (ref 23–29)
CREAT SERPL-MCNC: 0.8 MG/DL (ref 0.5–1.4)
DIFFERENTIAL METHOD BLD: NORMAL
EOSINOPHIL # BLD AUTO: 0.3 K/UL (ref 0–0.5)
EOSINOPHIL NFR BLD: 3.6 % (ref 0–8)
ERYTHROCYTE [DISTWIDTH] IN BLOOD BY AUTOMATED COUNT: 13 % (ref 11.5–14.5)
EST. GFR  (NO RACE VARIABLE): >60 ML/MIN/1.73 M^2
ESTIMATED AVG GLUCOSE: 100 MG/DL (ref 68–131)
GLUCOSE SERPL-MCNC: 71 MG/DL (ref 70–110)
HBA1C MFR BLD: 5.1 % (ref 4–5.6)
HCT VFR BLD AUTO: 43.2 % (ref 37–48.5)
HDLC SERPL-MCNC: 55 MG/DL (ref 40–75)
HDLC SERPL: 26.7 % (ref 20–50)
HGB BLD-MCNC: 14.4 G/DL (ref 12–16)
IMM GRANULOCYTES # BLD AUTO: 0.02 K/UL (ref 0–0.04)
IMM GRANULOCYTES NFR BLD AUTO: 0.3 % (ref 0–0.5)
LDLC SERPL CALC-MCNC: 121 MG/DL (ref 63–159)
LYMPHOCYTES # BLD AUTO: 2.2 K/UL (ref 1–4.8)
LYMPHOCYTES NFR BLD: 31.7 % (ref 18–48)
MCH RBC QN AUTO: 30.1 PG (ref 27–31)
MCHC RBC AUTO-ENTMCNC: 33.3 G/DL (ref 32–36)
MCV RBC AUTO: 90 FL (ref 82–98)
MONOCYTES # BLD AUTO: 0.7 K/UL (ref 0.3–1)
MONOCYTES NFR BLD: 10.5 % (ref 4–15)
NEUTROPHILS # BLD AUTO: 3.6 K/UL (ref 1.8–7.7)
NEUTROPHILS NFR BLD: 53 % (ref 38–73)
NONHDLC SERPL-MCNC: 151 MG/DL
NRBC BLD-RTO: 0 /100 WBC
PLATELET # BLD AUTO: 277 K/UL (ref 150–450)
PMV BLD AUTO: 11.3 FL (ref 9.2–12.9)
POTASSIUM SERPL-SCNC: 3.7 MMOL/L (ref 3.5–5.1)
PROT SERPL-MCNC: 7.6 G/DL (ref 6–8.4)
RBC # BLD AUTO: 4.79 M/UL (ref 4–5.4)
SODIUM SERPL-SCNC: 138 MMOL/L (ref 136–145)
TRIGL SERPL-MCNC: 150 MG/DL (ref 30–150)
WBC # BLD AUTO: 6.87 K/UL (ref 3.9–12.7)

## 2024-07-29 PROCEDURE — 85025 COMPLETE CBC W/AUTO DIFF WBC: CPT | Performed by: NURSE PRACTITIONER

## 2024-07-29 PROCEDURE — 99396 PREV VISIT EST AGE 40-64: CPT | Mod: S$GLB,,, | Performed by: NURSE PRACTITIONER

## 2024-07-29 PROCEDURE — 80053 COMPREHEN METABOLIC PANEL: CPT | Performed by: NURSE PRACTITIONER

## 2024-07-29 PROCEDURE — 80061 LIPID PANEL: CPT | Performed by: NURSE PRACTITIONER

## 2024-07-29 PROCEDURE — 83036 HEMOGLOBIN GLYCOSYLATED A1C: CPT | Performed by: NURSE PRACTITIONER

## 2024-07-29 PROCEDURE — 36415 COLL VENOUS BLD VENIPUNCTURE: CPT | Mod: PO | Performed by: NURSE PRACTITIONER

## 2024-07-29 PROCEDURE — 99999 PR PBB SHADOW E&M-EST. PATIENT-LVL III: CPT | Mod: PBBFAC,,, | Performed by: NURSE PRACTITIONER

## 2024-07-29 RX ORDER — CITALOPRAM 20 MG/1
20 TABLET, FILM COATED ORAL
Qty: 90 TABLET | Refills: 0 | Status: SHIPPED | OUTPATIENT
Start: 2024-07-29 | End: 2024-07-29 | Stop reason: SDUPTHER

## 2024-07-29 RX ORDER — ONDANSETRON 4 MG/1
4 TABLET, ORALLY DISINTEGRATING ORAL EVERY 6 HOURS PRN
Qty: 30 TABLET | Refills: 0 | Status: SHIPPED | OUTPATIENT
Start: 2024-07-29

## 2024-07-29 RX ORDER — CITALOPRAM 20 MG/1
20 TABLET, FILM COATED ORAL DAILY
Qty: 90 TABLET | Refills: 3 | Status: SHIPPED | OUTPATIENT
Start: 2024-07-29

## 2024-07-29 NOTE — TELEPHONE ENCOUNTER
Provider Staff:  Action required for this patient    Requires appointment      Please see care gap opportunities below in Care Due Message.    Thanks!  [facility]     Appointments      Date Provider   Last Visit   7/17/2023 Jeffry Helms MD   Next Visit   Visit date not found Jeffry Helms MD     Refill Decision Note   Jimena Lezama  is requesting a refill authorization.  Brief Assessment and Rationale for Refill:  Approve     Medication Therapy Plan:         Comments:     Note composed:1:06 PM 07/29/2024

## 2024-07-29 NOTE — PROGRESS NOTES
Subjective:       Patient ID: Jimena Lezama is a 43 y.o. female.    Chief Complaint: Annual Exam    HPI  Patient presents for annual exam    Hypothyroidism: managed by endo. Controlled.     Depression: mood stable on Celexa    GYN Dr. Jose C BRADFORD on pap and mammo    Colonoscopy 1/18/24 diverticulosis otherwise normal repeat 10 years    Obesity BMI 43. Frustrated--works out regularly, eats clean     Vitals:    07/29/24 1305   BP: 116/78   Pulse: 68   Temp: 97.8 °F (36.6 °C)     Review of Systems   Constitutional:  Negative for fever.   Respiratory:  Negative for cough and shortness of breath.    Cardiovascular:  Negative for chest pain.   Gastrointestinal:  Negative for constipation and diarrhea.       Past Medical History:   Diagnosis Date    Anxiety     Hypothyroid     Irritable bowel     Obesity      Objective:      Physical Exam  Vitals and nursing note reviewed.   Constitutional:       General: She is not in acute distress.     Appearance: She is not diaphoretic.   HENT:      Head: Normocephalic.   Eyes:      General:         Right eye: No discharge.         Left eye: No discharge.   Neck:      Trachea: No tracheal deviation.   Cardiovascular:      Rate and Rhythm: Normal rate and regular rhythm.      Heart sounds: Normal heart sounds.   Pulmonary:      Effort: Pulmonary effort is normal.   Skin:     Coloration: Skin is not pale.   Neurological:      Mental Status: She is alert and oriented to person, place, and time.   Psychiatric:         Speech: Speech normal.         Behavior: Behavior normal.         Thought Content: Thought content normal.         Judgment: Judgment normal.         Assessment:       1. Preventative health care    2. Depression, unspecified depression type    3. Morbid obesity with BMI of 40.0-44.9, adult    4. Nausea        Plan:       Preventative health care  -     CBC Auto Differential; Future; Expected date: 07/29/2024  -     Comprehensive Metabolic Panel; Future; Expected  date: 07/29/2024  -     Lipid Panel; Future; Expected date: 07/29/2024  -     Hemoglobin A1C; Future; Expected date: 07/29/2024    Depression, unspecified depression type  -     citalopram (CELEXA) 20 MG tablet; Take 1 tablet (20 mg total) by mouth once daily.  Dispense: 90 tablet; Refill: 3    Morbid obesity with BMI of 40.0-44.9, adult  -     semaglutide, weight loss, 0.25 mg/0.5 mL PnIj; Inject 0.25 mg into the skin every 7 days.  Dispense: 0.5 mL; Refill: 4    Nausea  -     ondansetron (ZOFRAN-ODT) 4 MG TbDL; Take 1 tablet (4 mg total) by mouth every 6 (six) hours as needed (nausea, vomiting).  Dispense: 30 tablet; Refill: 0        4 week virtual after starting Wegovy  1 year PCP      Medication List with Changes/Refills   New Medications    ONDANSETRON (ZOFRAN-ODT) 4 MG TBDL    Take 1 tablet (4 mg total) by mouth every 6 (six) hours as needed (nausea, vomiting).    SEMAGLUTIDE, WEIGHT LOSS, 0.25 MG/0.5 ML PNIJ    Inject 0.25 mg into the skin every 7 days.   Current Medications    LEVOTHYROXINE (SYNTHROID) 100 MCG TABLET    Take 1 tablet (100 mcg total) by mouth before breakfast.    LIOTHYRONINE (CYTOMEL) 5 MCG TAB    TAKE 1 TABLET BY MOUTH DAILY   Changed and/or Refilled Medications    Modified Medication Previous Medication    CITALOPRAM (CELEXA) 20 MG TABLET citalopram (CELEXA) 20 MG tablet       Take 1 tablet (20 mg total) by mouth once daily.    TAKE 1 TABLET(20 MG) BY MOUTH EVERY DAY   Discontinued Medications    DEXAMETHASONE (DECADRON) 1 MG TAB    Take one tablet between 11-12 pm. Draw acth and cortisol the following morning at 8 am.

## 2024-07-30 ENCOUNTER — PATIENT OUTREACH (OUTPATIENT)
Dept: ADMINISTRATIVE | Facility: HOSPITAL | Age: 44
End: 2024-07-30
Payer: COMMERCIAL

## 2024-07-30 NOTE — PROGRESS NOTES
Population Health Chart Review & Patient Outreach Details      Additional Pop Health Notes:               Updates Requested / Reviewed:      Updated Care Coordination Note and External Sources: DIS         Health Maintenance Topics Overdue:      VBHM Score: 0     Patient is not due for any topics at this time.                       Health Maintenance Topic(s) Outreach Outcomes & Actions Taken:    Breast Cancer Screening - Outreach Outcomes & Actions Taken  : External Records Uploaded & Care Team Updated if Applicable

## 2024-10-14 DIAGNOSIS — M25.551 RIGHT HIP PAIN: Primary | ICD-10-CM

## 2024-10-15 ENCOUNTER — HOSPITAL ENCOUNTER (OUTPATIENT)
Dept: RADIOLOGY | Facility: HOSPITAL | Age: 44
Discharge: HOME | End: 2024-10-15
Attending: NURSE PRACTITIONER
Payer: COMMERCIAL

## 2024-10-15 ENCOUNTER — OFFICE VISIT (OUTPATIENT)
Dept: ORTHOPEDICS | Facility: CLINIC | Age: 44
End: 2024-10-15
Payer: COMMERCIAL

## 2024-10-15 DIAGNOSIS — M79.18 PIRIFORMIS MUSCLE PAIN: Primary | ICD-10-CM

## 2024-10-15 DIAGNOSIS — M25.551 RIGHT HIP PAIN: ICD-10-CM

## 2024-10-15 PROCEDURE — 99214 OFFICE O/P EST MOD 30 MIN: CPT | Mod: S$GLB,,, | Performed by: NURSE PRACTITIONER

## 2024-10-15 PROCEDURE — 99999 PR PBB SHADOW E&M-EST. PATIENT-LVL III: CPT | Mod: PBBFAC,,, | Performed by: NURSE PRACTITIONER

## 2024-10-15 PROCEDURE — 73502 X-RAY EXAM HIP UNI 2-3 VIEWS: CPT | Mod: 26,RT,, | Performed by: RADIOLOGY

## 2024-10-15 PROCEDURE — 73502 X-RAY EXAM HIP UNI 2-3 VIEWS: CPT | Mod: TC,PO,RT

## 2024-10-15 RX ORDER — IBUPROFEN 800 MG/1
800 TABLET ORAL 3 TIMES DAILY
Qty: 90 TABLET | Refills: 0 | Status: SHIPPED | OUTPATIENT
Start: 2024-10-15 | End: 2024-11-14

## 2024-10-15 RX ORDER — METHOCARBAMOL 750 MG/1
750 TABLET, FILM COATED ORAL 4 TIMES DAILY PRN
Qty: 60 TABLET | Refills: 0 | Status: SHIPPED | OUTPATIENT
Start: 2024-10-15 | End: 2024-11-14

## 2024-10-15 NOTE — PROGRESS NOTES
Chief complaint: right hip pain    HPI:    This is a 43 y.o. who presents today complaining of right hip pain for 3 months after no known injury. Pain is constant and throbbing. Radiates down her right leg. Rates pain 4/10. Reports she has to stretch and then it pops to remain comfortable. No numbness or tingling. No associated signs or symptoms.      Past Medical History:   Diagnosis Date    Anxiety     Hypothyroid     Irritable bowel     Obesity       Past Surgical History:   Procedure Laterality Date     SECTION, CLASSIC      ORIF FOOT FRACTURE      TUBAL LIGATION        Current Outpatient Medications on File Prior to Visit   Medication Sig Dispense Refill    citalopram (CELEXA) 20 MG tablet Take 1 tablet (20 mg total) by mouth once daily. 90 tablet 3    levothyroxine (SYNTHROID) 100 MCG tablet Take 1 tablet (100 mcg total) by mouth before breakfast. 30 tablet 11    liothyronine (CYTOMEL) 5 MCG Tab TAKE 1 TABLET BY MOUTH DAILY 90 tablet 3    SEMAGLUTIDE, WEIGHT LOSS, SUBQ Inject 0.75 mg into the skin once a week.       No current facility-administered medications on file prior to visit.      Review of patient's allergies indicates:  No Known Allergies   Family History not pertinent   Social History     Socioeconomic History    Marital status:     Number of children: 4   Tobacco Use    Smoking status: Never     Passive exposure: Never    Smokeless tobacco: Never   Substance and Sexual Activity    Alcohol use: Yes     Comment: social    Drug use: No     Social Drivers of Health     Financial Resource Strain: Medium Risk (2024)    Overall Financial Resource Strain (CARDIA)     Difficulty of Paying Living Expenses: Somewhat hard   Food Insecurity: Food Insecurity Present (2024)    Hunger Vital Sign     Worried About Running Out of Food in the Last Year: Sometimes true     Ran Out of Food in the Last Year: Sometimes true   Transportation Needs: No Transportation Needs (2024)    PRAPARE -  Transportation     Lack of Transportation (Medical): No     Lack of Transportation (Non-Medical): No   Physical Activity: Sufficiently Active (1/31/2024)    Exercise Vital Sign     Days of Exercise per Week: 4 days     Minutes of Exercise per Session: 60 min   Stress: No Stress Concern Present (1/31/2024)    [employer] [place] [employer] - Occupational Stress Questionnaire     Feeling of Stress : Not at all   Housing Stability: High Risk (1/31/2024)    Housing Stability Vital Sign     Unable to Pay for Housing in the Last Year: Yes     Number of Places Lived in the Last Year: 1     Unstable Housing in the Last Year: No         Review of Systems:   Constitutional:  Denies fever or chills    Eyes:  Denies change in visual acuity    HENT:  Denies nasal congestion or sore throat    Respiratory:  Denies cough or shortness of breath    Cardiovascular:  Denies chest pain or edema    GI:  Denies abdominal pain, nausea, vomiting, bloody stools or diarrhea    :  Denies dysuria    Integument:  Denies rash    Neurologic:  Denies headache, focal weakness or sensory changes    Endocrine:  Denies polyuria or polydipsia    Lymphatic:  Denies swollen glands    Psychiatric:  Denies depression or anxiety       Physical Exam:    Constitutional:  Well developed, well nourished, no acute distress, non-toxic appearance    Integument:  Well hydrated  Neurologic:  Alert & oriented x 3  Psychiatric:  Speech and behavior appropriate      Bilateral Hip Exam Performed    Right Hip Exam     Tenderness   The patient is experiencing tenderness in the greater trochanter.    Range of Motion   The patient has normal hip ROM.    Muscle Strength   Abduction: 4/5     Other   Erythema: absent  Sensation: normal  Pulse: present    Left Hip Exam   Hip exam performed same as contralateral hip and is normal.     X-rays were performed today, personally reviewed by me and findings discussed with the patient.  2 views of the right hip show no  fractures or dislocations           Assessment & plan    Piriformis muscle pain  -     ibuprofen (ADVIL,MOTRIN) 800 MG tablet; Take 1 tablet (800 mg total) by mouth 3 (three) times daily.  Dispense: 90 tablet; Refill: 0  -     methocarbamoL (ROBAXIN) 750 MG Tab; Take 1 tablet (750 mg total) by mouth 4 (four) times daily as needed (muscle spasms).  Dispense: 60 tablet; Refill: 0    Given handout on stretches.   Return to clinic in 4 weeks with MD Dylan for piriformis injection if pain continues.

## 2025-01-02 ENCOUNTER — OFFICE VISIT (OUTPATIENT)
Dept: ORTHOPEDICS | Facility: CLINIC | Age: 45
End: 2025-01-02
Payer: COMMERCIAL

## 2025-01-02 VITALS
HEIGHT: 69 IN | HEART RATE: 71 BPM | BODY MASS INDEX: 41.01 KG/M2 | WEIGHT: 276.88 LBS | SYSTOLIC BLOOD PRESSURE: 122 MMHG | DIASTOLIC BLOOD PRESSURE: 78 MMHG

## 2025-01-02 DIAGNOSIS — M79.18 PIRIFORMIS MUSCLE PAIN: Primary | ICD-10-CM

## 2025-01-02 PROCEDURE — 99213 OFFICE O/P EST LOW 20 MIN: CPT | Mod: S$GLB,,, | Performed by: NURSE PRACTITIONER

## 2025-01-02 PROCEDURE — 99999 PR PBB SHADOW E&M-EST. PATIENT-LVL III: CPT | Mod: PBBFAC,,, | Performed by: NURSE PRACTITIONER

## 2025-01-02 NOTE — PROGRESS NOTES
Chief Complaint   Patient presents with    Right Hip - Pain      HPI:   This is a 44 y.o. who presents to clinic today for right hip pain for the past 10 days after no known trauma. Complains of pain while sleeping on side and when descending stairs. Pain is dull and deep. No numbness or tingling. No associated signs or symptoms. Previous appt with me she was having right hip pain x 3 months and was given methocarbamol and ibuprofen.       Past Medical History:   Diagnosis Date    Anxiety     Hypothyroid     Irritable bowel     Obesity      Past Surgical History:   Procedure Laterality Date     SECTION, CLASSIC      ORIF FOOT FRACTURE      TUBAL LIGATION       Current Outpatient Medications on File Prior to Visit   Medication Sig Dispense Refill    citalopram (CELEXA) 20 MG tablet Take 1 tablet (20 mg total) by mouth once daily. 90 tablet 3    levothyroxine (SYNTHROID) 100 MCG tablet Take 1 tablet (100 mcg total) by mouth before breakfast. 30 tablet 11    liothyronine (CYTOMEL) 5 MCG Tab TAKE 1 TABLET BY MOUTH DAILY 90 tablet 3    SEMAGLUTIDE, WEIGHT LOSS, SUBQ Inject 0.75 mg into the skin once a week.       No current facility-administered medications on file prior to visit.     Review of patient's allergies indicates:  No Known Allergies  Family History   Problem Relation Name Age of Onset    Cancer Mother  54        breast cancer; aggresive    Celiac disease Mother      Allergies Brother          gluten intol    Allergies Maternal Aunt          gluten intol    Celiac disease Sister      Thyroid cancer Neg Hx      Diabetes Neg Hx      Allergic rhinitis Neg Hx      Angioedema Neg Hx      Asthma Neg Hx      Eczema Neg Hx      Immunodeficiency Neg Hx      Urticaria Neg Hx      Rhinitis Neg Hx       Social History     Socioeconomic History    Marital status:     Number of children: 4   Tobacco Use    Smoking status: Never     Passive exposure: Never    Smokeless tobacco: Never   Substance and Sexual  Activity    Alcohol use: Yes     Comment: social    Drug use: No     Social Drivers of Health     Financial Resource Strain: Medium Risk (1/31/2024)    Overall Financial Resource Strain (CARDIA)     Difficulty of Paying Living Expenses: Somewhat hard   Food Insecurity: Food Insecurity Present (1/31/2024)    Hunger Vital Sign     Worried About Running Out of Food in the Last Year: Sometimes true     Ran Out of Food in the Last Year: Sometimes true   Transportation Needs: No Transportation Needs (1/31/2024)    PRAPARE - Transportation     Lack of Transportation (Medical): No     Lack of Transportation (Non-Medical): No   Physical Activity: Sufficiently Active (1/31/2024)    Exercise Vital Sign     Days of Exercise per Week: 4 days     Minutes of Exercise per Session: 60 min   Stress: No Stress Concern Present (1/31/2024)    [employer] [place] [employer] - Occupational Stress Questionnaire     Feeling of Stress : Not at all   Housing Stability: High Risk (1/31/2024)    Housing Stability Vital Sign     Unable to Pay for Housing in the Last Year: Yes     Number of Places Lived in the Last Year: 1     Unstable Housing in the Last Year: No       Review of Systems:  Constitutional:  Denies fever or chills   Eyes:  Denies change in visual acuity   HENT:  Denies nasal congestion or sore throat   Respiratory:  Denies cough or shortness of breath   Cardiovascular:  Denies chest pain or edema   GI:  Denies abdominal pain, nausea, vomiting, bloody stools or diarrhea   :  Denies dysuria   Integument:  Denies rash   Neurologic:  Denies headache, focal weakness or sensory changes   Endocrine:  Denies polyuria or polydipsia   Lymphatic:  Denies swollen glands   Psychiatric:  Denies depression or anxiety     Physical Exam:   Constitutional:  Well developed, well nourished, no acute distress, non-toxic appearance   Integument:  Well hydrated  Neurologic:  Alert & oriented x 3  Psychiatric:  Speech and behavior appropriate      Bilateral Hip Exam    Left Hip Exam   Left hip exam performed same as contralateral hip and is normal.     Right Hip Exam   Tenderness   The patient is experiencing tenderness in the greater trochanter.  Range of Motion   The patient has normal hip ROM.  Muscle Strength   Abduction: 4/5   Other   Erythema: absent  Sensation: normal  Pulse: present           Piriformis muscle pain  -     Ambulatory Referral/Consult to Physical Therapy; Future; Expected date: 01/09/2025    We discussed different options- physical therapy to evaluate and treatment, greater hip bursa injection and referral to MD Jesus for botox injection.

## 2025-01-07 ENCOUNTER — PATIENT MESSAGE (OUTPATIENT)
Dept: ORTHOPEDICS | Facility: CLINIC | Age: 45
End: 2025-01-07
Payer: COMMERCIAL

## 2025-01-09 ENCOUNTER — OFFICE VISIT (OUTPATIENT)
Dept: ORTHOPEDICS | Facility: CLINIC | Age: 45
End: 2025-01-09
Payer: COMMERCIAL

## 2025-01-09 VITALS — WEIGHT: 276.88 LBS | HEIGHT: 69 IN | BODY MASS INDEX: 41.01 KG/M2

## 2025-01-09 DIAGNOSIS — M70.61 TROCHANTERIC BURSITIS OF RIGHT HIP: Primary | ICD-10-CM

## 2025-01-09 DIAGNOSIS — M79.18 PIRIFORMIS MUSCLE PAIN: ICD-10-CM

## 2025-01-09 PROCEDURE — 99999 PR PBB SHADOW E&M-EST. PATIENT-LVL III: CPT | Mod: PBBFAC,,, | Performed by: NURSE PRACTITIONER

## 2025-01-09 RX ORDER — TRIAMCINOLONE ACETONIDE 40 MG/ML
40 INJECTION, SUSPENSION INTRA-ARTICULAR; INTRAMUSCULAR
Status: DISCONTINUED | OUTPATIENT
Start: 2025-01-09 | End: 2025-01-09 | Stop reason: HOSPADM

## 2025-01-09 RX ADMIN — TRIAMCINOLONE ACETONIDE 40 MG: 40 INJECTION, SUSPENSION INTRA-ARTICULAR; INTRAMUSCULAR at 08:01

## 2025-01-09 NOTE — PROGRESS NOTES
Chief Complaint   Patient presents with    Right Hip - Pain      HPI:   This is a 44 y.o. who presents to clinic today for right hip pain for the past several days after no known trauma, but usually has had this pain for the past several months but comes and goes. Sometimes are more painful than others. Complains of pain while sleeping on side and when descending stairs. Pain is dull and deep. No numbness or tingling. No associated signs or symptoms.      Past Medical History:   Diagnosis Date    Anxiety     Hypothyroid     Irritable bowel     Obesity      Past Surgical History:   Procedure Laterality Date     SECTION, CLASSIC      ORIF FOOT FRACTURE      TUBAL LIGATION       Current Outpatient Medications on File Prior to Visit   Medication Sig Dispense Refill    citalopram (CELEXA) 20 MG tablet Take 1 tablet (20 mg total) by mouth once daily. 90 tablet 3    levothyroxine (SYNTHROID) 100 MCG tablet Take 1 tablet (100 mcg total) by mouth before breakfast. 30 tablet 11    liothyronine (CYTOMEL) 5 MCG Tab TAKE 1 TABLET BY MOUTH DAILY 90 tablet 3    SEMAGLUTIDE, WEIGHT LOSS, SUBQ Inject 0.75 mg into the skin once a week.       No current facility-administered medications on file prior to visit.     Review of patient's allergies indicates:  No Known Allergies  Family History   Problem Relation Name Age of Onset    Cancer Mother  54        breast cancer; aggresive    Celiac disease Mother      Allergies Brother          gluten intol    Allergies Maternal Aunt          gluten intol    Celiac disease Sister      Thyroid cancer Neg Hx      Diabetes Neg Hx      Allergic rhinitis Neg Hx      Angioedema Neg Hx      Asthma Neg Hx      Eczema Neg Hx      Immunodeficiency Neg Hx      Urticaria Neg Hx      Rhinitis Neg Hx       Social History     Socioeconomic History    Marital status:     Number of children: 4   Tobacco Use    Smoking status: Never     Passive exposure: Never    Smokeless tobacco: Never    Substance and Sexual Activity    Alcohol use: Yes     Comment: social    Drug use: No     Social Drivers of Health     Financial Resource Strain: Medium Risk (1/31/2024)    Overall Financial Resource Strain (CARDIA)     Difficulty of Paying Living Expenses: Somewhat hard   Food Insecurity: Food Insecurity Present (1/31/2024)    Hunger Vital Sign     Worried About Running Out of Food in the Last Year: Sometimes true     Ran Out of Food in the Last Year: Sometimes true   Transportation Needs: No Transportation Needs (1/31/2024)    PRAPARE - Transportation     Lack of Transportation (Medical): No     Lack of Transportation (Non-Medical): No   Physical Activity: Sufficiently Active (1/31/2024)    Exercise Vital Sign     Days of Exercise per Week: 4 days     Minutes of Exercise per Session: 60 min   Stress: No Stress Concern Present (1/31/2024)    [employer] [place] [employer] - Occupational Stress Questionnaire     Feeling of Stress : Not at all   Housing Stability: High Risk (1/31/2024)    Housing Stability Vital Sign     Unable to Pay for Housing in the Last Year: Yes     Number of Places Lived in the Last Year: 1     Unstable Housing in the Last Year: No       Review of Systems:  Constitutional:  Denies fever or chills   Eyes:  Denies change in visual acuity   HENT:  Denies nasal congestion or sore throat   Respiratory:  Denies cough or shortness of breath   Cardiovascular:  Denies chest pain or edema   GI:  Denies abdominal pain, nausea, vomiting, bloody stools or diarrhea   :  Denies dysuria   Integument:  Denies rash   Neurologic:  Denies headache, focal weakness or sensory changes   Endocrine:  Denies polyuria or polydipsia   Lymphatic:  Denies swollen glands   Psychiatric:  Denies depression or anxiety     Physical Exam:   Constitutional:  Well developed, well nourished, no acute distress, non-toxic appearance   Integument:  Well hydrated  Neurologic:  Alert & oriented x 3  Psychiatric:  Speech  and behavior appropriate     Bilateral Hip Exam    Left Hip Exam   Left hip exam performed same as contralateral hip and is normal.     Right Hip Exam   Tenderness   The patient is experiencing tenderness in the greater trochanter and piriformis.  Range of Motion   The patient has normal hip ROM.  Muscle Strength   Abduction: 4/5   Other   Erythema: absent  Sensation: normal  Pulse: present        Trochanteric bursitis of right hip  -     Large Joint Aspiration/Injection: R greater trochanteric bursa  -     triamcinolone acetonide injection 40 mg    Piriformis muscle pain         Using an aseptic technique, Dr. Richardson injected 5 cc of lidocaine 1% without and 1 cc of kenalog 40mg into the right Hip. The patient tolerated this well. I will have them return to clinic in 4-6 weeks.

## 2025-01-09 NOTE — PROCEDURES
Large Joint Aspiration/Injection: R greater trochanteric bursa    Date/Time: 1/9/2025 8:40 AM    Performed by: Ludin Richardson MD  Authorized by: Pau Monterroso FNP    Consent Done?:  Yes (Verbal)  Indications:  Pain  Timeout: prior to procedure the correct patient, procedure, and site was verified    Prep: patient was prepped and draped in usual sterile fashion      Local anesthesia used?: Yes    Local anesthetic:  Lidocaine 1% without epinephrine  Anesthetic total (ml):  5      Details:  Needle Size:  22 G  Approach:  Lateral  Location:  Hip  Site:  R greater trochanteric bursa  Medications:  40 mg triamcinolone acetonide 40 mg/mL  Patient tolerance:  Patient tolerated the procedure well with no immediate complications     Tried calling pt due to he came into the office and dropped off a list of his medications with the   and all it says is please take a look at these medications; medications are listed below:  * Atenolol 50 mg 1 bid   * Amlodipine 5 mg 1 bid   * Atorvastatin 20 mg 1 bid  * Metformin 500 mg 1 bid   * Lisinopirl 20 mg 1 bid   *Indapamide 1.25 mg 1 Qd   * Amlodipine 2.5 mg 1 at 6 pm   * Atorvastatin 40 mg 1 Qd  *Ferocon in the am   *blood thinner qam   *Aspirin 81 mg Qd    Unable to reach pt left message for him to return call.

## 2025-01-16 DIAGNOSIS — Z71.3 NUTRITIONAL COUNSELING: Primary | ICD-10-CM

## 2025-01-24 ENCOUNTER — PATIENT MESSAGE (OUTPATIENT)
Dept: ENDOCRINOLOGY | Facility: CLINIC | Age: 45
End: 2025-01-24
Payer: COMMERCIAL

## 2025-01-24 DIAGNOSIS — E06.3 HYPOTHYROIDISM DUE TO HASHIMOTO'S THYROIDITIS: ICD-10-CM

## 2025-01-26 DIAGNOSIS — E06.3 HYPOTHYROIDISM DUE TO HASHIMOTO'S THYROIDITIS: ICD-10-CM

## 2025-01-27 RX ORDER — LIOTHYRONINE SODIUM 5 UG/1
TABLET ORAL
Qty: 30 TABLET | Refills: 0 | Status: SHIPPED | OUTPATIENT
Start: 2025-01-27 | End: 2025-02-03 | Stop reason: SDUPTHER

## 2025-01-27 RX ORDER — LEVOTHYROXINE SODIUM 100 UG/1
100 TABLET ORAL
Qty: 30 TABLET | Refills: 0 | Status: SHIPPED | OUTPATIENT
Start: 2025-01-27 | End: 2025-02-03 | Stop reason: SDUPTHER

## 2025-01-29 ENCOUNTER — LAB VISIT (OUTPATIENT)
Dept: LAB | Facility: HOSPITAL | Age: 45
End: 2025-01-29
Attending: PHYSICIAN ASSISTANT
Payer: COMMERCIAL

## 2025-01-29 DIAGNOSIS — E06.3 HYPOTHYROIDISM DUE TO HASHIMOTO'S THYROIDITIS: ICD-10-CM

## 2025-01-29 DIAGNOSIS — E55.9 HYPOVITAMINOSIS D: ICD-10-CM

## 2025-01-29 LAB
25(OH)D3+25(OH)D2 SERPL-MCNC: 39 NG/ML (ref 30–96)
T4 FREE SERPL-MCNC: 0.79 NG/DL (ref 0.71–1.51)
TSH SERPL DL<=0.005 MIU/L-ACNC: 1.3 UIU/ML (ref 0.4–4)

## 2025-01-29 PROCEDURE — 84443 ASSAY THYROID STIM HORMONE: CPT | Performed by: PHYSICIAN ASSISTANT

## 2025-01-29 PROCEDURE — 82306 VITAMIN D 25 HYDROXY: CPT | Performed by: PHYSICIAN ASSISTANT

## 2025-01-29 PROCEDURE — 84439 ASSAY OF FREE THYROXINE: CPT | Performed by: PHYSICIAN ASSISTANT

## 2025-01-29 PROCEDURE — 36415 COLL VENOUS BLD VENIPUNCTURE: CPT | Mod: PO | Performed by: PHYSICIAN ASSISTANT

## 2025-02-03 ENCOUNTER — OFFICE VISIT (OUTPATIENT)
Dept: ENDOCRINOLOGY | Facility: CLINIC | Age: 45
End: 2025-02-03
Payer: COMMERCIAL

## 2025-02-03 DIAGNOSIS — E06.3 HYPOTHYROIDISM DUE TO HASHIMOTO'S THYROIDITIS: Primary | ICD-10-CM

## 2025-02-03 DIAGNOSIS — E55.9 HYPOVITAMINOSIS D: ICD-10-CM

## 2025-02-03 PROCEDURE — G2211 COMPLEX E/M VISIT ADD ON: HCPCS | Mod: 95,,, | Performed by: PHYSICIAN ASSISTANT

## 2025-02-03 PROCEDURE — 98004 SYNCH AUDIO-VIDEO EST SF 10: CPT | Mod: 95,,, | Performed by: PHYSICIAN ASSISTANT

## 2025-02-03 RX ORDER — LIOTHYRONINE SODIUM 5 UG/1
TABLET ORAL
Qty: 90 TABLET | Refills: 3 | Status: SHIPPED | OUTPATIENT
Start: 2025-02-03

## 2025-02-03 RX ORDER — LEVOTHYROXINE SODIUM 100 UG/1
100 TABLET ORAL
Qty: 90 TABLET | Refills: 3 | Status: SHIPPED | OUTPATIENT
Start: 2025-02-03 | End: 2026-02-03

## 2025-02-03 NOTE — PROGRESS NOTES
Patient ID:  Jimena Lezama is a 44 y.o. female.    The patient location is: Home  The chief complaint leading to consultation is: Hypothyroidism    Visit type: audiovisual    Face to Face time with patient: 5 min  10 minutes of total time spent on the encounter, which includes face to face time and non-face to face time preparing to see the patient (eg, review of tests), Obtaining and/or reviewing separately obtained history, Documenting clinical information in the electronic or other health record, Independently interpreting results (not separately reported) and communicating results to the patient/family/caregiver, or Care coordination (not separately reported).     Each patient to whom he or she provides medical services by telemedicine is:  (1) informed of the relationship between the physician and patient and the respective role of any other health care provider with respect to management of the patient; and (2) notified that he or she may decline to receive medical services by telemedicine and may withdraw from such care at any time.    Chief Complaint:  Hypothyroidism      Pt presents to follow up for hypothyroidism.      Overview: Onset of Hashimoto's was in 2008. Notes symptoms occurred after having kids. Had 4 kids in 5 years, and last had twins. Has been on Levothyroxine 75 mcg daily for the last 10 years. Notes history of anxiety and has been taking Celexa. Previously saw Dr. Sandifer and Dr. Robles.  She is using testosterone pellets from her GYN w/out estrogen.     Hypothyroidism  LT4 100 mcg qd  Lt3 5 mcg daily    + fatigue, hair loss, heat intolerance, wt loss.     Denies in memory changes, constipation, cold intolerance.     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 Her brother has hypothyroidism.   Works out 5 days a week.   On ozempic. 0.75 mg weekly    Seeing a nutritionist. Takes supplements magnesium, glucosamine/chronditin, probiotics, vitamin D, biotin, and collagen.  +TPO in the past.    Has  restless legs at night. Notes low BP and low heart rate. Notes mom has celiac and sister has gluten sensitivity.    Reports controlled reactive hypoglycemia. Reports low libido.     Denies voice changes, hoarseness or history of head/neck radiation. Had a thyroid US 10 years ago that showed goiter but no nodules.     Thyroid u/s 5/22 did not show any nodules and normal sized thyroid. Cycles are regular. Taking vd.     Has nutritionist and is doing well with diet.      Wt Readings from Last 6 Encounters:   01/24/25 124.7 kg (275 lb)   01/09/25 125.6 kg (276 lb 14.4 oz)   01/02/25 125.6 kg (276 lb 14.4 oz)   12/27/24 125.6 kg (277 lb)   10/24/24 127 kg (280 lb)   09/26/24 128.4 kg (283 lb)      Review of Systems   Constitutional:  Negative for diaphoresis, fatigue and unexpected weight change.   HENT:  Negative for trouble swallowing and voice change.    Eyes:  Negative for visual disturbance.   Respiratory:  Negative for shortness of breath.    Cardiovascular:  Negative for chest pain.   Gastrointestinal:  Negative for abdominal pain, constipation, diarrhea, nausea and vomiting.   Endocrine: Negative for cold intolerance and heat intolerance.   Musculoskeletal:  Negative for myalgias.   Skin:  Negative for wound.   Neurological:  Negative for headaches.   Hematological:  Negative for adenopathy.   Psychiatric/Behavioral:  Negative for sleep disturbance.         Past Medical History:   Diagnosis Date    Anxiety     Hypothyroid     Irritable bowel     Obesity       Social History     Tobacco Use    Smoking status: Never     Passive exposure: Never    Smokeless tobacco: Never   Substance Use Topics    Alcohol use: Yes     Comment: social    Drug use: No     Family History   Problem Relation Name Age of Onset    Cancer Mother  54        breast cancer; aggresive    Celiac disease Mother      Allergies Brother          gluten intol    Allergies Maternal Aunt          gluten intol    Celiac disease Sister      Thyroid  cancer Neg Hx      Diabetes Neg Hx      Allergic rhinitis Neg Hx      Angioedema Neg Hx      Asthma Neg Hx      Eczema Neg Hx      Immunodeficiency Neg Hx      Urticaria Neg Hx      Rhinitis Neg Hx        Past Surgical History:   Procedure Laterality Date     SECTION, CLASSIC      ORIF FOOT FRACTURE      TUBAL LIGATION            Current Outpatient Medications:     cefUROXime (CEFTIN) 250 MG tablet, Take 1 tablet (250 mg total) by mouth 2 (two) times daily., Disp: 10 tablet, Rfl: 0    citalopram (CELEXA) 20 MG tablet, Take 1 tablet (20 mg total) by mouth once daily., Disp: 90 tablet, Rfl: 3    levothyroxine (SYNTHROID) 100 MCG tablet, Take 1 tablet (100 mcg total) by mouth before breakfast., Disp: 30 tablet, Rfl: 0    liothyronine (CYTOMEL) 5 MCG Tab, TAKE 1 TABLET BY MOUTH DAILY, Disp: 30 tablet, Rfl: 0    SEMAGLUTIDE, WEIGHT LOSS, SUBQ, Inject 0.75 mg into the skin once a week., Disp: , Rfl:      Review of patient's allergies indicates:  No Known Allergies     There were no vitals taken for this visit.  BP Readings from Last 3 Encounters:   25 118/84   25 122/78   24 122/78     Wt Readings from Last 3 Encounters:   25 1418 124.7 kg (275 lb)   25 0852 125.6 kg (276 lb 14.4 oz)   25 1314 125.6 kg (276 lb 14.4 oz)        PE:  GENERAL: Well developed, well nourished  CARDIOVASCULAR: Normal heart sounds, no pedal edema  RESPIRATORY: Normal effort,     Personally reviewed labs below:    Lab Results   Component Value Date     2024    K 3.7 2024     2024    CO2 26 2024    BUN 13 2024    CREATININE 0.8 2024    GLU 71 2024    HGBA1C 5.1 2024    AST 28 2024    ALT 32 2024    ALBUMIN 4.3 2024    ALBUMIN 4.4 2021    PROT 7.6 2024    BILITOT 0.3 2024    WBC 6.87 2024    HGB 14.4 2024    HCT 43.2 2024    MCV 90 2024    MCH 30.1 2024    MCHC 33.3 2024     RDW 13.0 07/29/2024     07/29/2024    MPV 11.3 07/29/2024    GRAN 3.6 07/29/2024    GRAN 53.0 07/29/2024    LYMPH 2.2 07/29/2024    LYMPH 31.7 07/29/2024    CHOL 206 (H) 07/29/2024    HDL 55 07/29/2024    LDLCALC 121.0 07/29/2024    TRIG 150 07/29/2024     Lab Results   Component Value Date    TSH 1.304 01/29/2025    [id number] 56 (L) 06/26/2021    U8XPNMC 4.8 05/29/2017    FREET4 0.79 01/29/2025          Latest Ref Rng & Units 1/30/2024     1:49 PM 7/29/2024     1:38 PM 1/29/2025     3:20 PM   Thyroid Labs   TSH 0.400 - 4.000 uIU/mL 1.477   1.304    Free T4 0.71 - 1.51 ng/dL 0.78   0.79    Sodium 136 - 145 mmol/L  138     Potassium 3.5 - 5.1 mmol/L  3.7     Chloride 95 - 110 mmol/L  105     Carbon Dioxide 23 - 29 mmol/L  26     Glucose 70 - 110 mg/dL  71     Blood Urea Nitrogen 6 - 20 mg/dL  13     Creatinine 0.5 - 1.4 mg/dL  0.8     Calcium 8.7 - 10.5 mg/dL  9.6     Total Protein 6.0 - 8.4 g/dL  7.6     Albumin 3.5 - 5.2 g/dL  4.3     Total Bilirubin 0.1 - 1.0 mg/dL  0.3     AST 10 - 40 U/L  28     ALT 10 - 44 U/L  32     Anion Gap 8 - 16 mmol/L  7     WBC 3.90 - 12.70 K/uL  6.87     RBC 4.00 - 5.40 M/uL  4.79     Hemoglobin 12.0 - 16.0 g/dL  14.4     Hematocrit 37.0 - 48.5 %  43.2     MCV 82 - 98 fL  90     MCH 27.0 - 31.0 pg  30.1     MCHC 32.0 - 36.0 g/dL  33.3     RDW 11.5 - 14.5 %  13.0     Platelets 150 - 450 K/uL  277     MPV 9.2 - 12.9 fL  11.3     Gran # 1.8 - 7.7 K/uL  3.6     Lymph # 1.0 - 4.8 K/uL  2.2     Mono # 0.3 - 1.0 K/uL  0.7     Eos # 0.0 - 0.5 K/uL  0.3     Baso # 0.00 - 0.20 K/uL  0.06     Gran % 38.0 - 73.0 %  53.0     Lymph % 18.0 - 48.0 %  31.7     Mono% 4.0 - 15.0 %  10.5     Eos % 0.0 - 8.0 %  3.6     Baso % 0.0 - 1.9 %  0.9         Hemoglobin A1C   Date Value Ref Range Status   07/29/2024 5.1 4.0 - 5.6 % Final     Comment:     ADA Screening Guidelines:  5.7-6.4%  Consistent with prediabetes  >or=6.5%  Consistent with diabetes    High levels of fetal hemoglobin interfere with the  HbA1C  assay. Heterozygous hemoglobin variants (HbS, HgC, etc)do  not significantly interfere with this assay.   However, presence of multiple variants may affect accuracy.     01/27/2015 4.8 4.5 - 6.2 % Final     1. Hypothyroidism due to Hashimoto's thyroiditis  TSH    T4, Free      2. Hypovitaminosis D  CANCELED: Vitamin D            Hypothyroidism-TFTs wnl. Dose changed on FT4 last time. Pt is losing wt. Continue LT4 100 mcg qd. Continue LT3 5 mcg qd.Thyroid was normal size last u/s. Will repeat if she has sx.  Hypovitaminosis b-xbavnq-kaftrild vd.   Obesity-There is no height or weight on file to calculate BMI.   DMST wnl. Continue exercise. Pt is on ozempic from PCP.    F/u in 6 mths-tfts

## 2025-02-27 ENCOUNTER — NUTRITION (OUTPATIENT)
Dept: NUTRITION | Facility: CLINIC | Age: 45
End: 2025-02-27
Payer: COMMERCIAL

## 2025-02-27 ENCOUNTER — PATIENT MESSAGE (OUTPATIENT)
Dept: NUTRITION | Facility: CLINIC | Age: 45
End: 2025-02-27

## 2025-02-27 DIAGNOSIS — Z71.3 NUTRITIONAL COUNSELING: ICD-10-CM

## 2025-02-27 DIAGNOSIS — E66.01 MORBID OBESITY WITH BMI OF 40.0-44.9, ADULT: ICD-10-CM

## 2025-02-27 DIAGNOSIS — Z78.9 WEIGHT LOSS ADVISED: ICD-10-CM

## 2025-02-27 PROCEDURE — 99999 PR PBB SHADOW E&M-EST. PATIENT-LVL II: CPT | Mod: PBBFAC,,, | Performed by: NUTRITIONIST

## 2025-02-27 PROCEDURE — 97802 MEDICAL NUTRITION INDIV IN: CPT | Mod: S$GLB,,, | Performed by: NUTRITIONIST

## 2025-02-27 NOTE — PROGRESS NOTES
"Nutrition Assessment    Visit Type: Insurance initial  Session Time:  1 Hour 30 Minutes  Reason for MNT visit: Pt in for education and nutrition counseling regarding Obesity.       Age: 44 y.o.  Wt:   Wt Readings from Last 1 Encounters:   25 124.7 kg (275 lb)     Ht:   Ht Readings from Last 1 Encounters:   25 5' 9" (1.753 m)     BMI:   BMI Readings from Last 1 Encounters:   25 40.61 kg/m²       Client states:  2..: Gracie Norwood referred. 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 She was diagnosed with Hoshimoto's after her twins were born. Stated she has always been one to have a hard time losing weight even with exercise and having a healthy diet    Medical History  Problem List           Resolved    Hypothyroid         Class 2 obesity due to excess calories without serious comorbidity with body mass index (BMI) of 37.0 to 37.9 in adult         Depression         Chronic fatigue            Past Medical History:   Diagnosis Date    Anxiety     Hypothyroid     Irritable bowel     Obesity        Past Surgical History:   Procedure Laterality Date     SECTION, CLASSIC      ORIF FOOT FRACTURE      TUBAL LIGATION            Medications    Prior to Admission medications    Medication Sig Start Date End Date Taking? Authorizing Provider   cefUROXime (CEFTIN) 250 MG tablet Take 1 tablet (250 mg total) by mouth 2 (two) times daily. 25   Nelia Baeza, FNP   citalopram (CELEXA) 20 MG tablet Take 1 tablet (20 mg total) by mouth once daily. 24   Sonia Perez, NP   levothyroxine (SYNTHROID) 100 MCG tablet Take 1 tablet (100 mcg total) by mouth before breakfast. 2/3/25 2/3/26  RO Mckeon PA-C   liothyronine (CYTOMEL) 5 MCG Tab TAKE 1 TABLET BY MOUTH DAILY 2/3/25   RO Mckeon PA-C   SEMAGLUTIDE, WEIGHT LOSS, SUBQ Inject 0.75 mg into the skin once a week.    Provider, Historical      Food Allergies or Intolerances:  " NKFA, but wants a gluten free meal plan since her mother has Celiac     Social History    Marital status:      Social History     Tobacco Use    Smoking status: Never     Passive exposure: Never    Smokeless tobacco: Never   Substance Use Topics    Alcohol use: Yes     Comment: social     Current Alcohol use: none since she started Semaglutide 6 months ago    Lab Reports:  Reviewed and noted     Lab Results   Component Value Date    [id number] 39 01/29/2025    TSH 1.304 01/29/2025    FREET4 0.79 01/29/2025    AST 28 07/29/2024    ALT 32 07/29/2024    HDL 55 07/29/2024    LDLCALC 121.0 07/29/2024    TRIG 150 07/29/2024    HGBA1C 5.1 07/29/2024    HGBA1C 4.8 01/27/2015         BP Readings from Last 1 Encounters:   01/24/25 118/84        24-hour Recall: over past 6 months:  Wake-up: 7:00 am  Breakfast 7:30 am: coffee with Collagen (2 scoops) + 2 scoops Benefiber  Not hungry between due to Semaglutide    10:00: 2 pieces of Sourdough with butter (most days)///on the road 2 Banza waffles  Lunch: skips because not hungry    2:00 pm: tuna (1 pouch) with 1 tablespoon bonilla; with crackers (GF Scharr crackers); dexter chicken tenders (5)/ with BBQ sauce, ketchup, Ranch//1 Quest protein shake.    On the way to the gym 4:30 pm: Quest bar    Dinner: 6:30 pm: BBQ chicken links on the grill (Sweet baby Ray's) with Alexia Sweet potato fries in Airfryer/// Mexican (ground turkey 93/7) with pre-made cup of  brown rice, salsa, guacamole with fajita steak with  tortilla chips (handful)//Banza chickpea pasta (1/2 cup) with 2 Italian sausage GF meatballs// If not hungry plain 0% Greek yogurt cup with whole grain cheerio's     Nothing after dinner        Craves baked lays     Beverages:  Water: 1 gallon per day  Coffee: 3-4 tablespoons half-n-half    LIFESTYLE FACTORS    Dinning out: 1-2 x per month    Meal preparation/shopping: self    Sleep: good    Energy Level: good    Stress Level: moderate    Support System:  spouse,  children, and friends    Exercise Regimen: Moderately active (moderate intensity, 3-5 days a week)      Diagnosis    Inadequate protein intake related to not consuming nearly enough protein due to not being hungry since on GLP1 as evidenced by 24 hour recall.      Intervention    Estimated Energy Requirements:   Calories: 1,800 kcal  Protein: 140-160 grams  Carbohydrates: ~140 grams  Fats: ~70 grams; Choose plant-based heart healthy fats  Total fluid: ~135 oz + sweat loss  Limit added sugar to 20 grams or less per day (Note: 1 teaspoon of sugar = ~5 gram)    Recommendations & Goals:  Patient goals and recommendations are tailored to the specific patient's needs, readiness to change, lifestyle, culture, skills, resources, & abilities. Strategies to help achieve these nutrition-related goals were discussed which can include but are not limited to SMART goal setting & mindful eating.     Aim for a minimum of 7 hours sleep   Exercise 60 minutes most days  Eat breakfast within 1-2 hours of waking up  Try not to skip any meals or snacks, not going more than 3-4 hours without eating   At each meal and snack, try to include a source of fiber + lean protein + healthy fat     Written Materials Provided  These resources are intended to assist the patient in making it easier to choose recommended options when eating out & to identify better-for-you brands at the grocery store:    Meal Planning Guide with recommendations discussed along with portion sizes and a customized meal plan   Fueling Well On-the-Go Food Guide  Eat Fit Shopping Guide  Lifestyle Nutrition Meal Guide  RD contact information    Goals:   Eat (or drink protein) every 3-4 hours. A snack is needed between meals where you are going >4 hours (Setting alarms on your phone may initially help as a reminder)  Food is fuel for your body  Eating often helps boost metabolism  Helps preserve lean muscle  Helps with portion control  Focus on understanding at most how many  carbohydrate servings I recommend for each meal and snack with dinner being a bit lower in carbs. The remainder of your plate will consist of lean protein and non-starchy veggies  Portion Control:    -Measure and/or weigh your recommended (cooked) portions when you start your meal plan. See what each portion looks like on your plate, then eyeball portions for future meals  Put a real pair of shorts/pants on that button and try on every 3-4 weeks  Increase protein intake  Be sure to consume at least 25 grams protein within 1-hour post weight bearing exercises.   Exercise: On days where you can't go to [place] [employer] or go to [employer], take a 20-30 minute walk on your lunch break  Instead of taking a Selenium supplement, consume 3 Brazil nuts per day  Coffee: Do 2 tablespoons half-n-half  Increase non-starchy veggies  Mix riced cauliflower with brown rice    Monitoring/Evaluation    Monitor the following:  Weight  Sleep  Stress Management  Movement  Nutrient intake in reference to meal plan    Communicated with healthcare provider and documented plan for referral to appropriate agency/healthcare provider as needed    Supervising Physician: Reed Otto MD    Patient motivation, anticipated barriers, expected compliance: Patient is motivated and has verbalized understanding and intent to comply.     Comprehension: good     Follow-up: 1 month

## 2025-02-27 NOTE — PATIENT INSTRUCTIONS
Name: Matilde Lezama   Date: 2.27.2025 (Gluten Free)  Nutrition protocol      Daily Energy Requirements:  Calories: 1,800 kcal  Protein: 140-160 grams  Carbohydrates: ~140 grams  Fats: ~70 grams; Choose plant-based heart healthy fats  Total fluid: ~135 oz + sweat loss  Limit added sugar to 20 grams or less per day (Note: 1 teaspoon of sugar = ~5 gram)    Goals:  Eat (or drink protein) every 3-4 hours. A snack is needed between meals where you are going >4 hours (Setting alarms on your phone may initially help as a reminder)  Food is fuel for your body  Eating often helps boost metabolism  Helps preserve lean muscle  Helps with portion control  Focus on understanding at most how many carbohydrate servings I recommend for each meal and snack with dinner being a bit lower in carbs. The remainder of your plate will consist of lean protein and non-starchy veggies  Portion Control:    -Measure and/or weigh your recommended (cooked) portions when you start your meal plan. See what each portion looks like on your plate, then eyeball portions for future meals  Put a real pair of shorts/pants on that button and try on every 3-4 weeks  Increase protein intake  Be sure to consume at least 25 grams protein within 1-hour post weight bearing exercises.   Exercise: On days where you can't go to [employer] or go to [employer]unch, take a 20-30 minute walk on your lunch break  Instead of taking a Selenium supplement, consume 3 Brazil nuts per day  Coffee: Do 2 tablespoons half-n-half  Increase non-starchy veggies  Mix riced cauliflower with brown rice    Breakfast: 2 servings of carbohydrates = 30-40 grams + at least 20 grams lean protein/heart healthy fat  Vital Proteins Collagen Peptides mixed in your coffee is fine (do 2 scoops per cup of coffee + 2 tablespoons half-n-half per cup of coffee)    Snack: A mid-morning snack may be needed if going >3-4 hours between meals     Lunch: 6 oz lean protein + unlimited non-starchy veggies + up  to 2 servings of carbohydrates = 30-40 grams  Examples of 2 servings of carbohydrates = ~30-40 grams:  1 cup cooked pasta (see notes below for brand examples); 2/3 cup cooked brown rice; 1 medium potato or sweet potato (5-6 oz in weight); 1 piece of fruit + 2 slices thin-sliced GF bread; 2 slices 100-calorie/large slice of GF bread; 1 serving of Sieta chips + ½ cup cooked GF whole grain starch, etc  The rest of your plate will consist of 6 oz lean protein + unlimited non-starchy veggies    Lunch Option Examples:   5 oz BBQ Chicken links (made with low sugar BBQ sauce such as Primal Kitchen or Missy) + 1 serving of Air Fried Alexia sweet potato fries + non-starchy veggies  2 tuna pouches (6 oz total) made with total of 2 tablespoons bonilla + 1 serving of GF crackers + 1 piece of fruit  2/3 cup cooked brown rice mixed with rice caulifower + 6 oz lean protein + non-starchy veggies    Snack: ~1 serving of carbohydrates = 15-25 grams + at least 30 grams lean protein/heart healthy fat + any non-starchy vegetables  1 slice Sourdough + 6 oz plain Greek yogurt with 3 Brazil nuts  2 regular size CHOMP sticks (10 grams protein per stick) + 1 ready to drink protein drink  2 hard-boiled eggs + 1 ready to drink protein drink + 1 piece of fruit    Dinner: Lower carbohydrate à ~ 1 serving of carbohydrates = 15-20 grams = ~½ cup cooked whole grain starch -or- a piece of fruit   The rest of your plate will consist of 6-7 oz lean protein + 1 cup or more non-starchy veggies cooked in ~1 tablespoon avocado oil/EVOO  For Mexican nights, for your carbohydrate source choose either 1/3 cup cooked rice -or- 1 serving of tortilla chips. The rest will be 6 oz lean protein + sauteed veggies (onions & peppers for example) + 2 tablespoons shredded cheese & 2 tablespoons guacamole  Carb swaps examples if desired:  Hearts of palm-based 'linguini'  'Riced' Cauliflower  'Riced' Broccoli  Cauliflower mash   Spaghetti squash  Zoodles  Spiralized  Eda Hernandez's Natural's Foods: Keto cauli mac-n-cheese [Found in refrigerated section at select Targets]   Outer Aisle Plantpower: pre-made frozen cauliflower pizza crust, sandwich thins and wraps   Roman'flour Foods: pre-made frozen cauliflower flatbreads and pizza crusts    Think outside the box for low carb dishes:  Kabobs, stir clark with riced cauliflower or riced broccoli, stuffed bell peppers with no rice, lettuce wraps, eggplant lasagna, shrimp etouffee made with riced cauliflower, etc  Quest: frozen pizza     Optional post-dinner snack or sweet: Anything up to 150 calories   'Fun size'        Restaurant Tips     Pick 1 out of the 4 Rule: Instead of eating gluten free bread/tortilla chips, an appetizer,  alcoholic drink, and dessert, choose just one to have with your entrée   Focus on lean proteins: Refer to lean meat/meat substitutes page in meal planning guidebook. Select items grilled, baked, broiled, braised, poached, or roasted       Order sauces, dressings, and gravies on the side. This way you can add 1-2 tablespoons yourself. This helps with portion control      Request extra non-starchy vegetables instead of a starchy side dish. If the starchy side is something you love, consider splitting it with someone else at the table      Beverages: Order water with lemon, sparkling water, or unsweetened tea. Avoid sugary soft drinks, juices, and mixers   Deep fried foods: Enjoy no more than 2x/month     Dining Out  - [facility]   Designed to take the guesswork out of dining out healthfully, [facility] makes the healthy choice the easy choice  Visit [email or web address]   Eat Fit Cookbook  Craft: The Eat Fit Guide to Zero Proof Cocktails  Download the [facility] Eat [facility] yissel for free on your smartphone  List of all [employer] Fit restaurant partners & dishes by location  Nutrition facts included for all Eat Fit dishes  200+ Eat Fit approved recipes  Eat Fit shopping guide + community wellness  resources    Building A Better Smoothie  Choose your protein. Pick 1 from the followin oz 2% Plain Greek yogurt   Plant-based protein powder  Orgain  [place]  Garden of Life RAW organic protein  Collagen Peptides: ~6-8 tablespoons (4 tablespoons = 20 grams protein)  Make it sweet:  Add ~1 cup fresh fruit -or- No Sugar Added frozen fruit   Add your veggies:  Instead of ice, choose frozen cauliflower florets.   Cauliflower helps with the detoxification process in our bodies, and it's virtually tasteless!  Greens: spinach or kale  Beets are a great addition to smoothies, especially paired with strawberries and lemon  Cucumbers and celery are refreshing ways to enhance nutrition and hydration within your smoothie  Add one of the following plant-based fats:  Nut Butter: 1 teaspoon - 1 tablespoon  Natural peanut butter  Nanty Glo butter  Cashew butter  Avocado: ¼ - ½ Paez   Avocado oil  EVOO: 1 teaspoon - 1 tablespoon  Add a liquid to reach your desired consistency:  Unsweetened/No Sugar Added plant-based milk alternative:   Nanty Glo, Cashew, Coconut, Rice, or Soy    Milk: Skim, 1% or 2%  Healthy add-ins for an extra nutritional boost:  ½ tablespoon flaxseeds     Ordering A Better-For-You Salad  Include a lean protein, any amount of non-starchy vegetables, and small amount of plant-based fats   Order dressings on the side to better control portion sizes. Add 1-2 tablespoons yourself to lightly coat  Pick 2-3 salad add-ins, each being ~2 tablespoons:  Dressing  Cheese  Nuts  Avocado/Guacamole    Additional Nutrition Tips  -Recommendations for activity to stay healthy:   Aim for at least 150 minutes per week of moderate-intensity aerobic activity   Aim for at least 2 days per week of muscle-strengthening activity   Post weight/resistance training: Be sure to consume at least 20 grams protein within 1-hour post workout   Reminder: Slow and steady wins the race. Start small and let the healthy changes grow    -Grocery Aisle  Food Brand Guidelines: Aj #7 rule   Look for products that have 7 grams or less added sugar, and at least 7 grams or more protein      -Frozen Meal Guidelines:    ~45 grams or less carbohydrates & at least 20 grams protein   If you find a brand you enjoy that doesn't provide 20 grams protein, you can add leftover lean protein to the frozen meal   Brand examples below  Refer to the Eat Fit Shopping Guide for additional brand specific recommendations     -Portion Control:   Measure and/or weigh your food in cooked portions for the first time you start your plan   See what each portion looks like on your plate and then eyeball each portion for future meals and snacks      -Avoid/Limit the following as these may be inflammatory to our body and can decrease Energy throughout the day if consumed often:   Added Sugar   Gluten Free white, refined, processed carbohydrates   Alcohol   Fried Foods     -If you're having trouble finding a specific food brand, try looking on the brands website. Most companies have a 'store   find a store' on their website        Gluten Free Brand Favorites  -Sliced Breads:  Clio-Bakehouse- All are 100% Whole Grain:  7-Grain  Ancient Grain   Hawaiian Sweet  Country White  Multigrain  Mountain White  Heritage Honey Style White  Heritage Style Whole Grain  Base Culture: 7 Nut & Seed and Original Paleo Bread  Unbun: Keto sliced bread (Whole Foods)  Food for Life: Gluten Free Brown Rice Bread  Carbonaut: Seeded low carb keto bread (Whole Foods)  -Bagels:   Clio-Bakehouse- All are 100% Whole Grain:  Plain  Everything  Deli White  Honey Whole Grain  -Buns & Rolls:  Clio-Bakehouse- All are 100% Whole Grain  Hawaiian Sweet Rolls  Brioche-Style Sweet Rolls  Sub Rolls  Burger Buns  -Wraps:  Corn   Siete Grain Free Tortillas (freezer section)  - Cold Cereals:  Tigist Crunch Keto Friendly Cereal  Magic Spoon -Target, [employer], online  Three Wishes   Iwon organic   -Pasta & Grains:  Banza  chickpea products  Pasta- any variety  Rice  Mac-n-cheese  Red lentil pasta - any variety  Black bean pasta (aka squid ink pasta)  Barilla: Chickpea Orzo  Ancient [place] gluten free pasta  Edamame pasta  Quinoa  Brown rice   RightRice: made from vegetables  RightRice  Risotto  -Pancake Mix:  Bethany Cakes Gluten Free Flapjack and Waffle Mix   Flapjacked Gluten Free Protein Pancake and baking mix   -Chips  Crackers:  Beanito's  Beanfields  [place] almond nut thins  VANs Gluten Free Say Cheese Cracker  Liyah's Gone Crackers  Nabisco: Brown Rice Thins  Crunchmaster Protein Sea Salt Cracker  Lesser Evil: Power Curls  Popcorners FLEX Protein Crisps    -High Protein Chips/Crunchy Snacks:   The Only Brunson Crunchy Roasted Edamame Beans (Available in Snacks Pack Sizes)  Whisps: Cheese Crisps - Any Flavor (13-grams protein per serving)  [place] Creamery: Crisps- Any Flavor (13-grams protein per serving)  Hippeas Chickpeas Puffs - Any Flavor  Iwon Organics: Protein Stix  Protein Puffs  Quest Protein Chips  Protes Protein Chips    - Frozen Waffles:  Kashi Gluten Free Original Waffle  Bethany Cakes Gluten Free and Whole Grain Protein Waffles  - Frozen Meals  Single Serving  Quest pizza  Note: Very high in sodium, so avoid/limit if you have high blood pressure or need to watch your sodium intake  Realgood Co: Enchilada's  Realgood Foods Co: Lightly Breaded Chicken Strips/Nuggets  Outer Aisle Plantpower makes a pre-made frozen cauliflower pizza crust & wraps as well as frozen sandwich thins  Roman'flour Foods: Keto Lasagna  Nature's Intent: Baked Cauli & Cheese ([employer])  -David's Natural Foods à Located in the refrigerated section of grocery stores  Heat and Eat Entrees   Keto cauli mac-n-cheese [only offered at select Targets]  - Red Sauce in a Jar:  Tre & Christi's Heart Smart Sauce (Roasted Garlic, Swengel or Original)  Primal Kitchen  Classico Original  -Better-For-You High Protein Sweet Treats:   Partake (store finder  located on website partakefoods )  HighKey Mini Cookies  Powerful: High Protein Bites  -Protein  Granola Bars:  Kashi Go   KIND PROTEIN  Rx  Oatmega  IQ   Quest  [place] Protein Chewy  -Flavored Greek Yogurt:  Oikos PRO 20 g Protein   Oikos Triple Zero  Chobani Less Sugar  Two Good    -Supplements:  Vitamin D3: 1,000 - 2,000 i.u per day   Fish Oil: Look for at least 1200 mg EPA + DHA per 2 capsules  My favorite brand: [employer]'s Ultimate Omega  One-A-Day MVI  Brand example:   [facility] Roman Men   [facility] Women's Multivitamin Ultra Roman                To schedule or reschedule a nutrition follow-up appointment, please call the Lifestyle Nutrition Department within [facility] at [phone] -or- email [email or web address]

## 2025-03-25 ENCOUNTER — TELEPHONE (OUTPATIENT)
Dept: ORTHOPEDICS | Facility: CLINIC | Age: 45
End: 2025-03-25
Payer: COMMERCIAL

## 2025-03-25 ENCOUNTER — PATIENT MESSAGE (OUTPATIENT)
Dept: ENDOCRINOLOGY | Facility: CLINIC | Age: 45
End: 2025-03-25
Payer: COMMERCIAL

## 2025-03-25 DIAGNOSIS — M79.18 PIRIFORMIS MUSCLE PAIN: Primary | ICD-10-CM

## 2025-03-25 DIAGNOSIS — M70.61 TROCHANTERIC BURSITIS OF RIGHT HIP: ICD-10-CM

## 2025-03-25 NOTE — TELEPHONE ENCOUNTER
----- Message from Karla sent at 3/25/2025  9:45 AM CDT -----  Regarding: NP PHYSICAL THERAPY NETTIE  Good morning,We have a patient that wants to schedule for her physical therapy evaluation but I need a new order put in please as it is over 30 days old and she'd never made an appointment.  Once we have a current order we will call her to get scheduled. Thank you,Janice Figueredo

## 2025-03-31 ENCOUNTER — PATIENT MESSAGE (OUTPATIENT)
Dept: ORTHOPEDICS | Facility: CLINIC | Age: 45
End: 2025-03-31
Payer: COMMERCIAL

## 2025-04-01 ENCOUNTER — OFFICE VISIT (OUTPATIENT)
Dept: ORTHOPEDICS | Facility: CLINIC | Age: 45
End: 2025-04-01
Payer: COMMERCIAL

## 2025-04-01 VITALS
BODY MASS INDEX: 40.72 KG/M2 | SYSTOLIC BLOOD PRESSURE: 118 MMHG | WEIGHT: 274.94 LBS | DIASTOLIC BLOOD PRESSURE: 84 MMHG | HEIGHT: 69 IN | HEART RATE: 74 BPM

## 2025-04-01 DIAGNOSIS — M70.61 TROCHANTERIC BURSITIS OF RIGHT HIP: Primary | ICD-10-CM

## 2025-04-01 DIAGNOSIS — M79.671 RIGHT FOOT PAIN: ICD-10-CM

## 2025-04-01 DIAGNOSIS — M79.671 RIGHT FOOT PAIN: Primary | ICD-10-CM

## 2025-04-01 DIAGNOSIS — Z98.890 STATUS POST RIGHT FOOT SURGERY: ICD-10-CM

## 2025-04-01 DIAGNOSIS — M79.18 PIRIFORMIS MUSCLE PAIN: ICD-10-CM

## 2025-04-01 PROCEDURE — 99999 PR PBB SHADOW E&M-EST. PATIENT-LVL IV: CPT | Mod: PBBFAC,,, | Performed by: NURSE PRACTITIONER

## 2025-04-01 RX ORDER — TRIAMCINOLONE ACETONIDE 40 MG/ML
40 INJECTION, SUSPENSION INTRA-ARTICULAR; INTRAMUSCULAR
Status: DISCONTINUED | OUTPATIENT
Start: 2025-04-01 | End: 2025-04-01 | Stop reason: HOSPADM

## 2025-04-01 RX ADMIN — TRIAMCINOLONE ACETONIDE 40 MG: 40 INJECTION, SUSPENSION INTRA-ARTICULAR; INTRAMUSCULAR at 10:04

## 2025-04-01 NOTE — PROCEDURES
Large Joint Aspiration/Injection: R greater trochanteric bursa    Date/Time: 4/1/2025 10:00 AM    Performed by: Ludin Richardson MD  Authorized by: Ludin Richardson MD    Consent Done?:  Yes (Verbal)  Indications:  Pain  Timeout: prior to procedure the correct patient, procedure, and site was verified    Prep: patient was prepped and draped in usual sterile fashion      Local anesthesia used?: Yes    Local anesthetic:  Lidocaine 1% without epinephrine  Anesthetic total (ml):  5      Details:  Needle Size:  22 G  Approach:  Lateral  Location:  Hip  Site:  R greater trochanteric bursa  Medications:  40 mg triamcinolone acetonide 40 mg/mL  Patient tolerance:  Patient tolerated the procedure well with no immediate complications

## 2025-04-01 NOTE — PROGRESS NOTES
Chief Complaint   Patient presents with    Right Hip - Pain      HPI:   This is a 44 y.o. who presents to clinic today for right hip pain for the past 2 weeks after no known trauma. Complains of pain while sleeping on side and when descending stairs. Pain is dull and deep. No numbness or tingling. No associated signs or symptoms.      Past Medical History:   Diagnosis Date    Anxiety     Hypothyroid     Irritable bowel     Obesity      Past Surgical History:   Procedure Laterality Date     SECTION, CLASSIC      ORIF FOOT FRACTURE      TUBAL LIGATION       Medications Ordered Prior to Encounter[1]  Review of patient's allergies indicates:  No Known Allergies  Family History   Problem Relation Name Age of Onset    Cancer Mother  54        breast cancer; aggresive    Celiac disease Mother      Allergies Brother          gluten intol    Allergies Maternal Aunt          gluten intol    Celiac disease Sister      Thyroid cancer Neg Hx      Diabetes Neg Hx      Allergic rhinitis Neg Hx      Angioedema Neg Hx      Asthma Neg Hx      Eczema Neg Hx      Immunodeficiency Neg Hx      Urticaria Neg Hx      Rhinitis Neg Hx       Social History[2]    Review of Systems:  Constitutional:  Denies fever or chills   Eyes:  Denies change in visual acuity   HENT:  Denies nasal congestion or sore throat   Respiratory:  Denies cough or shortness of breath   Cardiovascular:  Denies chest pain or edema   GI:  Denies abdominal pain, nausea, vomiting, bloody stools or diarrhea   :  Denies dysuria   Integument:  Denies rash   Neurologic:  Denies headache, focal weakness or sensory changes   Endocrine:  Denies polyuria or polydipsia   Lymphatic:  Denies swollen glands   Psychiatric:  Denies depression or anxiety     Physical Exam:   Constitutional:  Well developed, well nourished, no acute distress, non-toxic appearance   Integument:  Well hydrated  Neurologic:  Alert & oriented x 3  Psychiatric:  Speech and behavior appropriate      Bilateral Hip Exam    Left Hip Exam   Left hip exam performed same as contralateral hip and is normal.     Right Hip Exam   Tenderness   The patient is experiencing tenderness in the greater trochanter.  Range of Motion   The patient has normal hip ROM.  Muscle Strength   Abduction: 4/5   Other   Erythema: absent  Sensation: normal  Pulse: present          Trochanteric bursitis of right hip  -     Ambulatory Referral/Consult to Physical Therapy; Future; Expected date: 04/08/2025  -     Large Joint Aspiration/Injection: R greater trochanteric bursa  -     triamcinolone acetonide injection 40 mg    Piriformis muscle pain  -     Ambulatory Referral/Consult to Physical Therapy; Future; Expected date: 04/08/2025    Status post right foot surgery  -     Ambulatory referral/consult to Orthopedics; Future; Expected date: 04/08/2025    Right foot pain  -     Ambulatory referral/consult to Orthopedics; Future; Expected date: 04/08/2025           Using an aseptic technique, Dr. Richardson injected 5 cc of lidocaine 1% without and 1 cc of kenalog 40mg into the right Hip. The patient tolerated this well. I will have them return to clinic in 6 weeks with Dr. Richardson.           [1]   Current Outpatient Medications on File Prior to Visit   Medication Sig Dispense Refill    cefUROXime (CEFTIN) 250 MG tablet Take 1 tablet (250 mg total) by mouth 2 (two) times daily. 10 tablet 0    citalopram (CELEXA) 20 MG tablet Take 1 tablet (20 mg total) by mouth once daily. 90 tablet 3    levothyroxine (SYNTHROID) 100 MCG tablet Take 1 tablet (100 mcg total) by mouth before breakfast. 90 tablet 3    liothyronine (CYTOMEL) 5 MCG Tab TAKE 1 TABLET BY MOUTH DAILY 90 tablet 3    SEMAGLUTIDE, WEIGHT LOSS, SUBQ Inject 0.75 mg into the skin once a week.       No current facility-administered medications on file prior to visit.   [2]   Social History  Socioeconomic History    Marital status:     Number of children: 4   Tobacco Use    Smoking  status: Never     Passive exposure: Never    Smokeless tobacco: Never   Substance and Sexual Activity    Alcohol use: Yes     Comment: social    Drug use: No     Social Drivers of Health     Financial Resource Strain: Medium Risk (1/31/2024)    Overall Financial Resource Strain (CARDIA)     Difficulty of Paying Living Expenses: Somewhat hard   Food Insecurity: Food Insecurity Present (1/31/2024)    Hunger Vital Sign     Worried About Running Out of Food in the Last Year: Sometimes true     Ran Out of Food in the Last Year: Sometimes true   Transportation Needs: No Transportation Needs (1/31/2024)    PRAPARE - Transportation     Lack of Transportation (Medical): No     Lack of Transportation (Non-Medical): No   Physical Activity: Sufficiently Active (1/31/2024)    Exercise Vital Sign     Days of Exercise per Week: 4 days     Minutes of Exercise per Session: 60 min   Stress: No Stress Concern Present (1/31/2024)    [employer] - Occupational Stress Questionnaire     Feeling of Stress : Not at all   Housing Stability: High Risk (1/31/2024)    Housing Stability Vital Sign     Unable to Pay for Housing in the Last Year: Yes     Number of Places Lived in the Last Year: 1     Unstable Housing in the Last Year: No

## 2025-04-07 DIAGNOSIS — M79.671 RIGHT FOOT PAIN: Primary | ICD-10-CM

## 2025-04-08 ENCOUNTER — CLINICAL SUPPORT (OUTPATIENT)
Dept: REHABILITATION | Facility: HOSPITAL | Age: 45
End: 2025-04-08
Payer: COMMERCIAL

## 2025-04-08 DIAGNOSIS — R26.9 GAIT DIFFICULTY: ICD-10-CM

## 2025-04-08 DIAGNOSIS — M25.551 CHRONIC RIGHT HIP PAIN: Primary | ICD-10-CM

## 2025-04-08 DIAGNOSIS — M70.61 TROCHANTERIC BURSITIS OF RIGHT HIP: ICD-10-CM

## 2025-04-08 DIAGNOSIS — G89.29 CHRONIC RIGHT HIP PAIN: Primary | ICD-10-CM

## 2025-04-08 DIAGNOSIS — R29.898 HIP WEAKNESS: ICD-10-CM

## 2025-04-08 DIAGNOSIS — M79.18 PIRIFORMIS MUSCLE PAIN: ICD-10-CM

## 2025-04-08 PROCEDURE — 97162 PT EVAL MOD COMPLEX 30 MIN: CPT | Mod: PN

## 2025-04-08 PROCEDURE — 97110 THERAPEUTIC EXERCISES: CPT | Mod: PN

## 2025-04-08 NOTE — PROGRESS NOTES
Outpatient Rehab    Physical Therapy Evaluation    Patient Name: Jimena Lezama  MRN: [id number]  YOB: 1980  Encounter Date: 2025    Therapy Diagnosis:   Encounter Diagnoses   Name Primary?    Trochanteric bursitis of right hip     Piriformis muscle pain     Chronic right hip pain Yes    Gait difficulty     Hip weakness      Physician: Pau Monterroso FNP    Physician Orders: Eval and Treat  Medical Diagnosis: Trochanteric bursitis of right hip  Piriformis muscle pain    Visit # / Visits Authorized:    Insurance Authorization Period: 2025 to 2025  Date of Evaluation: 2025  Plan of Care Certification: 2025 to 2025     Time In: 0900   Time Out: 1000  Total Time: 60       Intake Outcome Measure for FOTO Survey    Therapist reviewed FOTO scores for Jimena Lezama on 2025.   FOTO report - see Media section or FOTO account episode details.     Intake Score: 64 (Higher better)%         Subjective   History of Present Illness  Jimena is a 44 y.o. female who reports to physical therapy with a chief concern of R lateral and posterior hip pain.         Diagnostic tests related to this condition: X-ray.   X-Ray Details: FINDINGS:  No fracture or dislocation.  Joint spaces are preserved.  No abnormal bony lucencies or sclerosis.  No radiopaque foreign body.     Impression:     As above.        Electronically signed by:Jan Bustos  Date:                                            10/15/2024  Time:                                           09:55    History of Present Condition/Illness: Au started ,  Last year did a lot of walking in [place] and started noticing pain, improved with rest and got better and then came back in  and got injection that helped some but now has come back again so wants to include PT in her treatment for better long term relief. She had injecttion about a week agao and got good relief of pain and hip still feels tight with some  sorness. Pnt does also report previous right foot injury and is due to be seen about that.    Activities of Daily Living  Social history was obtained from Patient.               Previously independent with activities of daily living? Yes     Currently independent with activities of daily living? Yes          Previously independent with instrumental activities of daily living? Yes     Currently independent with instrumental activities of daily living? Yes              Pain     Patient reports a current pain level of 0/10. Pain at best is reported as 0/10. Pain at worst is reported as 7/10.   Location: Right sided hip pain : Posterior/lateral  Clinical Progression (since onset): Improved  Pain Qualities: Throbbing  Pain-Relieving Factors: Rest, Other (Comment), Massage, Ice  Other Pain-Relieving Factors: steriod Injection  Pain-Aggravating Factors: Walking, Stair climbing, Squatting, Sleeping, Exercise           Past Medical History/Physical Systems Review:   Jimena Lezama  has a past medical history of Anxiety, Hypothyroid, Irritable bowel, and Obesity.    Jimena Lezama  has a past surgical history that includes ORIF foot fracture; Tubal ligation; and  section, classic.    Jimena has a current medication list which includes the following prescription(s): cefuroxime, citalopram, levothyroxine, liothyronine, and semaglutide.    Review of patient's allergies indicates:  No Known Allergies     Objective      Lower Extremity Sensation  Right Lumbar/Lower Extremity Sensation  Intact: Light Touch       Left Lumbar/Lower Extremity Sensation  Intact: Light Touch                Hip Palpation  Right Hip Palpation  Abnormal: Hip Muscle  Unremarkable: Hip Bony Prominence/Bursa and Hip Tendon/Ligament  Right Hip Muscle Palpation Observations: signficant TTP with some banding  Pain with palpation of right SIJ and lower lumbar paraspinals    Left Hip Palpation  Unremarkable: Hip Muscle, Hip Bony  Prominence/Bursa, and Hip Tendon/Ligament               Lumbar Range of Motion   Thoracolumbar ROM is good but does have some restriction and pain with lumbar extension      Hip Range of Motion    Hip AROM: WNL: bialteral    Knee Range of Motion    Bilateral AROM: WNL              Hip Strength - Planes of Motion   Right Strength Right Pain Left Strength Left  Pain   Flexion (L2) 4+   5     Extension 5   5     ABduction 4-   4+     ADduction 5   5     Internal Rotation 5   5     External Rotation 4+   5         Knee Strength   Right Strength Right Pain Left Strength Left  Pain   Flexion (S2) 5   5     Prone Flexion           Extension (L3) 5   5            Ankle/Foot Strength - Planes of Motion   Right Strength Right Pain Left Strength Left  Pain   Dorsiflexion (L4) 5   5     Plantar Flexion (S1)           Inversion 5   5     Eversion 5   5     Great Toe Flexion           Great Toe Extension (L5)           Lesser Toes Flexion           Lesser Toes Extension                  Lumbar/Pelvic Girdle Special Tests  Pelvic Girdle / Sacrum Tests  Negative: Right KIMMY, Left KIMMY, Right FADIR, and Left FADIR         Hip Special Tests  Intra-Articular/Impingement Tests  Negative: Right KIMMY, Left KIMMY, Right FADIR, Left FADIR, and Right Scour              Ambulation Assistance Required  Surface With  Assistive Device Without Assistive Device Details   Level Independent        Uneven         Curb           Gait Analysis  Base of Support: Wide  Gait Pattern: Waddling  Walking Speed: Decreased    Right Side Walking Observations  Decreased: Stance Time  Right Foot Contact Pattern: Flat foot    Left Foot Contact Pattern: Flat foot  Ankle/Foot Observations During Gait  Bilateral: Decreased MTP Extension During Terminal Stance Phase         Treatment:  Therapeutic Exercise  TE 1: supine: glute squeezes: x 10, 5 sec holds  TE 2: hook lying pelvic tilts x 10  TE 3: Supine Bridge: x 10 2 sec holds  TE 4: Gentle piriformis stretch x  30 sec      Time Entry(in minutes):  PT Evaluation (Moderate) Time Entry: 42  Therapeutic Exercise Time Entry: 10    Assessment & Plan   Assessment  Jimena presents with a condition of Moderate complexity.   Presentation of Symptoms: Evolving       Functional Limitations: Activity tolerance, Ambulating on uneven surfaces, Disrupted sleep pattern, Decreased ambulation distance/endurance, Functional mobility, Gait limitations, Pain with ADLs/IADLs, Sitting tolerance, Participating in leisure activities, Painful locomotion/ambulation, Completing work/school activities  Impairments: Abnormal gait, Activity intolerance, Impaired physical strength, Pain with functional activity  Personal Factors Affecting Prognosis: Physical limitations    Patient Goal for Therapy (PT): Help prevent pain from reuccuring and get something to help for long term management  Prognosis: Good  Assessment Details: Patient is doing better today after her injection last week but has a Hx with this condition where pain and symptoms comeback after injection wears off. She still has some significant pain with palpation to the lower lumbar spine and to the right glutes. She has some weakness to right glutes which is worse with Abd which can explain why her symptoms return. She is active and has improved her sitting routine with intermittent breaks which is help. We will need to address both back and hip issues to help establish a long term solution to her pain which will include an exercises program, manual therapy and dry needling.    Plan  From a physical therapy perspective, the patient would benefit from: Skilled Rehab Services    Planned therapy interventions include: Therapeutic activities, Therapeutic exercise, Neuromuscular re-education, Manual therapy, ADLs/IADLs, Gait training, Work conditioning, and Work hardening.    Planned modalities to include: Other (Comment) and Electrical stimulation - attended. Dry needling      Visit Frequency: 1  times Per Week for 8 Weeks.       This plan was discussed with Patient.   Plan details: Outpatient Physical Therapy to include the following interventions: Cervical/Lumbar Traction, Electrical Stimulation re-eval, dry needling, Gait Training, Iontophoresis (with ), Manual Therapy, Moist Heat/ Ice, Neuromuscular Re-ed, Patient Education, Self Care, Therapeutic Activities, and Therapeutic Exercise. Physical therapist and physical therapy assistant(s) will met face to face to discuss patient's treatment plan and progress towards established goals. Pt will be seen by a physical therapist minimally every 6th visit or every 30 days. Potential for KX modifier and additional visits based on subjective report, objective examination, nature of current condition, comorbidities, and current functional status.            Patient's spiritual, cultural, and educational needs considered and patient agreeable to plan of care and goals.           Goals:   Active       Short term goals       Increase hip abd strength on right by 1/2 grade       Start:  04/09/25    Expected End:  04/30/25            Decrease self reported hip tightness by 25% or more with ADLs       Start:  04/09/25    Expected End:  04/30/25            Decrease pain with palpation/pressure by 25% to lumbar paraspinals and hip or more per self report       Start:  04/09/25    Expected End:  04/30/25            Able to perform ADLs with 2/10 or less pain       Start:  04/09/25    Expected End:  04/30/25               long term goals       Increase right hip strength all planes 5/5       Start:  04/09/25    Expected End:  06/04/25            Decrease pain with all daily activities to 1/10 or less       Start:  04/09/25    Expected End:  06/04/25            Able to sleep at night w/o hip pain       Start:  04/09/25    Expected End:  06/04/25                Ad Dunn, PT

## 2025-04-10 ENCOUNTER — CLINICAL SUPPORT (OUTPATIENT)
Dept: REHABILITATION | Facility: HOSPITAL | Age: 45
End: 2025-04-10
Payer: COMMERCIAL

## 2025-04-10 DIAGNOSIS — R26.9 GAIT DIFFICULTY: ICD-10-CM

## 2025-04-10 DIAGNOSIS — M25.551 CHRONIC RIGHT HIP PAIN: Primary | ICD-10-CM

## 2025-04-10 DIAGNOSIS — R29.898 HIP WEAKNESS: ICD-10-CM

## 2025-04-10 DIAGNOSIS — G89.29 CHRONIC RIGHT HIP PAIN: Primary | ICD-10-CM

## 2025-04-10 PROCEDURE — 97110 THERAPEUTIC EXERCISES: CPT | Mod: PN

## 2025-04-10 PROCEDURE — 97140 MANUAL THERAPY 1/> REGIONS: CPT | Mod: PN

## 2025-04-10 NOTE — PROGRESS NOTES
Outpatient Rehab    Physical Therapy Visit    Patient Name: Jimena Lezama  MRN: [id number]  YOB: 1980  Encounter Date: 4/10/2025    Therapy Diagnosis:   Encounter Diagnoses   Name Primary?    Chronic right hip pain Yes    Gait difficulty     Hip weakness      Physician: Pau Monterroso FNP    Physician Orders: Eval and Treat  Medical Diagnosis: Trochanteric bursitis of right hip  Piriformis muscle pain    Visit # / Visits Authorized:  1 / 10  Insurance Authorization Period: 4/4/2025 to 12/31/2025  Date of Evaluation: 4/8/2025  Plan of Care Certification: 4/8/2025 to 6/4/2025        PT/PTA:     Number of PTA visits since last PT visit:   Time In: 1000   Time Out: 1100  Total Time: 60   Total Billable Time:      FOTO:  Intake Score:  %  Survey Score 1:  %  Survey Score 2:  %         Subjective   Patient reports she is feeling pretty good no pain today.She has been doing HEP w/o any issues..  Pain reported as 0/10.      Objective            Treatment:  Therapeutic Exercise  TE 1: supine: glute squeezes: x 10, 5 sec holds  TE 2: hook lying pelvic tilts x 10  TE 3: Supine Bridge: x 10 2 sec holds  TE 4: Gentle piriformis stretch x 30 sec  TE 5: Lateral stepping squats x 20 feet  TE 6: Shuttle squats: 50lbs x 10  TE 7: Standing Marching: x 10: bilateral  TE 8: Standing hip Abd x 10, Bilateral  Manual Therapy  MT 1: Discussed the purpose, mechanism, and indications of dry needling with pt. Pt was cleared of all precautions and contraindications, and pt signed consent form for dry needling performance today by a certified dry needling PT.  Also encouraged pt to drink extra water today to dilute metabolic bi-product cumulation. Pt verbalized understanding to all instruction.  Performed: dry needling to glute med/min, proximal piriformis with electrical stimulation.  MT 2: Glute med/min soft tissue mobilization      Time Entry(in minutes):  Manual Therapy Time Entry: 25  Therapeutic Exercise Time  Entry: 28    Assessment & Plan   Assessment: Patient did well with treatment today with improvement in facilitation of glute med/min contraction. We will continue to advance her exercise plan appopriately.  Evaluation/Treatment Tolerance: Patient tolerated treatment well    Patient will continue to benefit from skilled outpatient physical therapy to address the deficits listed in the problem list box on initial evaluation, provide pt/family education and to maximize pt's level of independence in the home and community environment.     Patient's spiritual, cultural, and educational needs considered and patient agreeable to plan of care and goals.           Plan: Outpatient Physical Therapy to include the following interventions: Cervical/Lumbar Traction, Electrical Stimulation re-eval, dry needling, Gait Training, Iontophoresis (with ), Manual Therapy, Moist Heat/ Ice, Neuromuscular Re-ed, Patient Education, Self Care, Therapeutic Activities, and Therapeutic Exercise. Physical therapist and physical therapy assistant(s) will met face to face to discuss patient's treatment plan and progress towards established goals. Pt will be seen by a physical therapist minimally every 6th visit or every 30 days. Potential for KX modifier and additional visits based on subjective report, objective examination, nature of current condition, comorbidities, and current functional status.    Goals:   Active       Short term goals       Increase hip abd strength on right by 1/2 grade (Progressing)       Start:  04/09/25    Expected End:  04/30/25            Decrease self reported hip tightness by 25% or more with ADLs (Progressing)       Start:  04/09/25    Expected End:  04/30/25            Decrease pain with palpation/pressure by 25% to lumbar paraspinals and hip or more per self report (Progressing)       Start:  04/09/25    Expected End:  04/30/25            Able to perform ADLs with 2/10 or less pain (Progressing)       Start:   04/09/25    Expected End:  04/30/25               long term goals       Increase right hip strength all planes 5/5 (Progressing)       Start:  04/09/25    Expected End:  06/04/25            Decrease pain with all daily activities to 1/10 or less (Progressing)       Start:  04/09/25    Expected End:  06/04/25            Able to sleep at night w/o hip pain (Progressing)       Start:  04/09/25    Expected End:  06/04/25                Ad Dunn, PT

## 2025-05-09 ENCOUNTER — CLINICAL SUPPORT (OUTPATIENT)
Dept: REHABILITATION | Facility: HOSPITAL | Age: 45
End: 2025-05-09
Payer: COMMERCIAL

## 2025-05-09 DIAGNOSIS — R26.9 GAIT DIFFICULTY: ICD-10-CM

## 2025-05-09 DIAGNOSIS — M25.551 CHRONIC RIGHT HIP PAIN: Primary | ICD-10-CM

## 2025-05-09 DIAGNOSIS — G89.29 CHRONIC RIGHT HIP PAIN: Primary | ICD-10-CM

## 2025-05-09 DIAGNOSIS — R29.898 HIP WEAKNESS: ICD-10-CM

## 2025-05-09 PROCEDURE — 97140 MANUAL THERAPY 1/> REGIONS: CPT | Mod: PN

## 2025-05-09 PROCEDURE — 97110 THERAPEUTIC EXERCISES: CPT | Mod: PN

## 2025-05-09 NOTE — PROGRESS NOTES
Outpatient Rehab    Physical Therapy Progress Note    Patient Name: Jimena Lezama  MRN: [id number]  YOB: 1980  Encounter Date: 5/9/2025    Therapy Diagnosis:   Encounter Diagnoses   Name Primary?    Chronic right hip pain Yes    Gait difficulty     Hip weakness      Physician: Pau Monterroso FNP    Physician Orders: Eval and Treat  Medical Diagnosis: Trochanteric bursitis of right hip  Piriformis muscle pain    Visit # / Visits Authorized:  2 / 10  Insurance Authorization Period: 4/4/2025 to 12/31/2025  Date of Evaluation: 4/8/2025  Plan of Care Certification: 4/8/2025 to 6/4/2025        Time In: 1100   Time Out: 1200  Total Time (in minutes): 60            Subjective   Patient reports she was fine after last visit so she tried to go back to gym and did 30 min of ellitical, lunges, squats and deadlifts and started having pain during her workout and has had pain since that time. She is having pain and tightness mid glute area..  Pain reported as 4/10. right glute    Objective          + TTP: right glut med/min  Treatment:  Therapeutic Exercise  TE 1: Static glute sets : x 10, 5 sec holds  TE 2: Glute stretchs : 3 x 30 sec: bilateral  TE 3: Bridges: x 10  Manual Therapy  MT 1: Discussed the purpose, mechanism, and indications of dry needling with pt. Pt was cleared of all precautions and contraindications, and pt signed consent form for dry needling performance today by a certified dry needling PT. Right glute med/min with electrical stimulation  MT 2: Soft tissue mobilization: right glute      Time Entry(in minutes):  Manual Therapy Time Entry: 25  Therapeutic Exercise Time Entry: 20    Assessment & Plan   Assessment: Patient was doing well and tried to return to the gym and unfortuantly did too much too soon causing another of her glute pain. She did well today and we spent some time discussing her gym/home exercises and how we needed to progress to allow her to return to gym  safely.  Evaluation/Treatment Tolerance: Patient tolerated treatment well    Patient will continue to benefit from skilled outpatient physical therapy to address the deficits listed in the problem list box on initial evaluation, provide pt/family education and to maximize pt's level of independence in the home and community environment.     Patient's spiritual, cultural, and educational needs considered and patient agreeable to plan of care and goals.           Plan: Outpatient Physical Therapy to include the following interventions: Cervical/Lumbar Traction, Electrical Stimulation re-eval, dry needling, Gait Training, Iontophoresis (with ), Manual Therapy, Moist Heat/ Ice, Neuromuscular Re-ed, Patient Education, Self Care, Therapeutic Activities, and Therapeutic Exercise. Physical therapist and physical therapy assistant(s) will met face to face to discuss patient's treatment plan and progress towards established goals. Pt will be seen by a physical therapist minimally every 6th visit or every 30 days. Potential for KX modifier and additional visits based on subjective report, objective examination, nature of current condition, comorbidities, and current functional status.    Goals:   Active       Short term goals       Increase hip abd strength on right by 1/2 grade (Progressing)       Start:  04/09/25    Expected End:  04/30/25            Decrease self reported hip tightness by 25% or more with ADLs (Progressing)       Start:  04/09/25    Expected End:  04/30/25            Decrease pain with palpation/pressure by 25% to lumbar paraspinals and hip or more per self report (Progressing)       Start:  04/09/25    Expected End:  04/30/25            Able to perform ADLs with 2/10 or less pain (Progressing)       Start:  04/09/25    Expected End:  04/30/25               long term goals       Increase right hip strength all planes 5/5 (Progressing)       Start:  04/09/25    Expected End:  06/04/25            Decrease  pain with all daily activities to 1/10 or less (Progressing)       Start:  04/09/25    Expected End:  06/04/25            Able to sleep at night w/o hip pain (Progressing)       Start:  04/09/25    Expected End:  06/04/25                Ad Dunn, PT

## 2025-05-20 ENCOUNTER — NUTRITION (OUTPATIENT)
Dept: NUTRITION | Facility: CLINIC | Age: 45
End: 2025-05-20
Payer: COMMERCIAL

## 2025-05-20 DIAGNOSIS — Z71.3 NUTRITIONAL COUNSELING: ICD-10-CM

## 2025-05-20 DIAGNOSIS — E66.01 MORBID OBESITY WITH BMI OF 40.0-44.9, ADULT: Primary | ICD-10-CM

## 2025-05-20 PROCEDURE — 97803 MED NUTRITION INDIV SUBSEQ: CPT | Mod: ,,, | Performed by: NUTRITIONIST

## 2025-05-20 PROCEDURE — 99999 PR PBB SHADOW E&M-EST. PATIENT-LVL II: CPT | Mod: PBBFAC,,, | Performed by: NUTRITIONIST

## 2025-05-20 NOTE — PATIENT INSTRUCTIONS
-Basal Metabolic Rate (BMR): This is an 'estimate' based off your age, height, weight and gender.   -Your BMR = 1,952 kcal  - Resting Metabolic Rate (RMR): This is 100% accurate in determining your metabolism (how many calories you burn at rest). We used a small handheld device called a BodyGem, which measures your gas exchange (oxygen being inhaled and carbon dioxide being exhaled). This tells you how many calories you burn at rest in a 24-hour period   -Your RMR = 2,020 kcal    -Exercise/movement: Any kind of upper body & core exercises you are able to do with weights, lets do it. Increase walking (Since you are doing about 4,000 steps per day, do at least 8,000 steps).   -Put on a 'real' pair of pants/shorts that button and go by the fit of that item eery 4 weeks.   -Consistency is key  -Track your meals/snacks in [employer]  and plug the following numbers in your [employer]:   Calories: 1,800 kcal  Protein: 140-160 grams  Carbohydrates: ~140 grams  Fats: ~70 grams; Choose plant-based heart healthy fats  Total fluid: ~135 oz + sweat loss  Limit added sugar to 20 grams or less per day (Note: 1 teaspoon of sugar = ~5 gram)    -Go back to being Gluten Free  -[employer] Protein Chips   -[employer] protein stix & puffs

## 2025-05-20 NOTE — PROGRESS NOTES
"Nutrition Assessment    Visit Type: Insurance follow-up + everardo laureano Metabolic Testing  Session Time:  45 minutes  Reason for MNT visit: Pt in for education and nutrition counseling regarding Obesity.       Age: 44 y.o.  Wt:   Wt Readings from Last 1 Encounters:   04/01/25 124.7 kg (274 lb 14.6 oz)     Ht:   Ht Readings from Last 1 Encounters:   04/01/25 5' 9" (1.753 m)     BMI:   BMI Readings from Last 1 Encounters:   04/01/25 40.60 kg/m²       Client states:      5.20.2025 follow-up: Linda was doing well at first while on Semaglutide and lost 15 pounds. She stated she then got off the medication and gained ~16 pounds back. She got back on Semaglutide 4 days ago. She is up 12.7 pounds body fat mass according to our Inbody composition machine since our initial visit in February. She is not able to exercise much due to chronic lower back/glute muscle pain. She quit [employer]. She is able to do upper body exercises is seeing a specialist in [place] for this issue.     -Basal Metabolic Rate (BMR): This is an 'estimate' based off one's age, height, weight, and gender.   -Patient's BMR = 1,952 kcal  - Resting Metabolic Rate (RMR): This is 100% accurate in determining one's metabolism (how many calories someone burns at rest). I used a small handheld device called a BodyGem, which measures the patient's gas exchange (oxygen being inhaled and carbon dioxide being exhaled). 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 She was diagnosed with Hoshimoto's after her twins were born. Stated she has always been one to have a hard time losing weight even with exercise and having a healthy diet    Medical History  Problem List           Resolved    Hypothyroid         Class 2 obesity due to excess calories without serious comorbidity with body mass " index (BMI) of 37.0 to 37.9 in adult         Depression         Chronic fatigue            Past Medical History:   Diagnosis Date    Anxiety     Hypothyroid     Irritable bowel     Obesity        Past Surgical History:   Procedure Laterality Date     SECTION, CLASSIC      ORIF FOOT FRACTURE      TUBAL LIGATION            Medications    Prior to Admission medications    Medication Sig Start Date End Date Taking? Authorizing Provider   cefUROXime (CEFTIN) 250 MG tablet Take 1 tablet (250 mg total) by mouth 2 (two) times daily. 25   Nelia Baeza, AMYP   citalopram (CELEXA) 20 MG tablet Take 1 tablet (20 mg total) by mouth once daily. 24   Sonia Perez, NP   levothyroxine (SYNTHROID) 100 MCG tablet Take 1 tablet (100 mcg total) by mouth before breakfast. 2/3/25 2/3/26  RO Mckeon PA-C   liothyronine (CYTOMEL) 5 MCG Tab TAKE 1 TABLET BY MOUTH DAILY 2/3/25   RO Mckeon PA-C   SEMAGLUTIDE, WEIGHT LOSS, SUBQ Inject 0.75 mg into the skin once a week.    Provider, Historical      Food Allergies or Intolerances:  NKFA, but wants a gluten free meal plan since her mother has Celiac     Social History    Marital status:      Social History     Tobacco Use    Smoking status: Never     Passive exposure: Never    Smokeless tobacco: Never   Substance Use Topics    Alcohol use: Yes     Comment: social     Current Alcohol use: none since she started Semaglutide 6 months ago    Lab Reports:  Reviewed and noted     Lab Results   Component Value Date    [id number] 39 2025    TSH 1.304 2025    FREET4 0.79 2025    AST 28 2024    ALT 32 2024    HDL 55 2024    LDLCALC 121.0 2024    TRIG 150 2024    HGBA1C 5.1 2024    HGBA1C 4.8 2015         BP Readings from Last 1 Encounters:   25 118/84        24-hour Recall: over past 6 months:  Wake-up: 7:00 am  Breakfast 7:30 am: coffee with Collagen (2 scoops) + 2 scoops Benefiber  Not  hungry between due to Semaglutide    10:00: 2 pieces of Sourdough with butter (most days)///on the road 2 Banza waffles  Lunch: skips because not hungry    2:00 pm: tuna (1 pouch) with 1 tablespoon bonilla; with crackers (GF Scharr crackers); dexter chicken tenders (5)/ with BBQ sauce, ketchup, Ranch//1 Quest protein shake.    On the way to the gym 4:30 pm: [facility] bar    Dinner: 6:30 pm: BBQ chicken links on the grill ([employer]) with Alexia Sweet potato fries in [employer]/// Mexican (ground turkey 93/7) with pre-made cup of  brown rice, salsa, guacamole with fajita steak with  tortilla chips (handful)//Banza chickpea pasta (1/2 cup) with 2 Italian sausage GF meatballs// If not hungry plain 0% Greek yogurt cup with whole grain cheerio's     Nothing after dinner        Craves baked lays     Beverages:  Water: 1 gallon per day  Coffee: 3-4 tablespoons half-n-half    LIFESTYLE FACTORS    Dinning out: 1-2 x per month    Meal preparation/shopping: self    Sleep: good    Energy Level: good    Stress Level: moderate    Support System:  spouse, children, and friends    Exercise Regimen: Moderately active (moderate intensity, 3-5 days a week)      Diagnosis    Inadequate protein intake related to not consuming nearly enough protein due to not being hungry since on GLP1 as evidenced by 24 hour recall.      Intervention    Estimated Energy Requirements:   Calories: 1,800 kcal  Protein: 140-160 grams  Carbohydrates: ~140 grams  Fats: ~70 grams; Choose plant-based heart healthy fats  Total fluid: ~135 oz + sweat loss  Limit added sugar to 20 grams or less per day (Note: 1 teaspoon of sugar = ~5 gram)    Recommendations & Goals:  Patient goals and recommendations are tailored to the specific patient's needs, readiness to change, lifestyle, culture, skills, resources, & abilities. Strategies to help achieve these nutrition-related goals were discussed which can include but are not limited to SMART goal setting & mindful  eating.     Aim for a minimum of 7 hours sleep   Exercise 60 minutes most days  Eat breakfast within 1-2 hours of waking up  Try not to skip any meals or snacks, not going more than 3-4 hours without eating   At each meal and snack, try to include a source of fiber + lean protein + healthy fat     Written Materials Provided  These resources are intended to assist the patient in making it easier to choose recommended options when eating out & to identify better-for-you brands at the grocery store:    Meal Planning Guide with recommendations discussed along with portion sizes and a customized meal plan   Fueling Well On-the-Go Food Guide  Eat Fit Shopping Guide  Lifestyle Nutrition Meal Guide  RD contact information    Goals:   Eat (or drink protein) every 3-4 hours. A snack is needed between meals where you are going >4 hours (Setting alarms on your phone may initially help as a reminder)  Food is fuel for your body  Eating often helps boost metabolism  Helps preserve lean muscle  Helps with portion control  Focus on understanding at most how many carbohydrate servings I recommend for each meal and snack with dinner being a bit lower in carbs. The remainder of your plate will consist of lean protein and non-starchy veggies  Portion Control:    -Measure and/or weigh your recommended (cooked) portions when you start your meal plan. See what each portion looks like on your plate, then eyeball portions for future meals  Put a real pair of shorts/pants on that button and try on every 3-4 weeks  Increase protein intake  Be sure to consume at least 25 grams protein within 1-hour post weight bearing exercises.   Exercise: On days where you can't go to [place] [employer] or go to [employer], take a 20-30 minute walk on your lunch break  Instead of taking a Selenium supplement, consume 3 Brazil nuts per day  Coffee: Do 2 tablespoons half-n-half  Increase non-starchy veggies  Mix riced cauliflower with brown  rice    Monitoring/Evaluation    Monitor the following:  Weight  Sleep  Stress Management  Movement  Nutrient intake in reference to meal plan    Communicated with healthcare provider and documented plan for referral to appropriate agency/healthcare provider as needed    Supervising Physician: Reed Otto MD    Patient motivation, anticipated barriers, expected compliance: Patient is motivated and has verbalized understanding and intent to comply.     Comprehension: good     Follow-up: 1 month

## 2025-08-04 ENCOUNTER — LAB VISIT (OUTPATIENT)
Dept: LAB | Facility: HOSPITAL | Age: 45
End: 2025-08-04
Attending: PHYSICIAN ASSISTANT
Payer: COMMERCIAL

## 2025-08-04 DIAGNOSIS — Z78.9 WEIGHT LOSS ADVISED: ICD-10-CM

## 2025-08-04 DIAGNOSIS — E06.3 HYPOTHYROIDISM DUE TO HASHIMOTO'S THYROIDITIS: ICD-10-CM

## 2025-08-04 LAB
ALBUMIN SERPL BCP-MCNC: 4.5 G/DL (ref 3.5–5.2)
ALP SERPL-CCNC: 66 UNIT/L (ref 40–150)
ALT SERPL W/O P-5'-P-CCNC: 31 UNIT/L (ref 0–55)
ANION GAP (OHS): 7 MMOL/L (ref 8–16)
AST SERPL-CCNC: 31 UNIT/L (ref 0–50)
BILIRUB SERPL-MCNC: 0.4 MG/DL (ref 0.1–1)
BUN SERPL-MCNC: 17 MG/DL (ref 6–20)
CALCIUM SERPL-MCNC: 9.4 MG/DL (ref 8.7–10.5)
CHLORIDE SERPL-SCNC: 105 MMOL/L (ref 95–110)
CHOLEST SERPL-MCNC: 199 MG/DL (ref 120–199)
CHOLEST/HDLC SERPL: 4.3 {RATIO} (ref 2–5)
CO2 SERPL-SCNC: 25 MMOL/L (ref 23–29)
CREAT SERPL-MCNC: 0.8 MG/DL (ref 0.5–1.4)
EAG (OHS): 94 MG/DL (ref 68–131)
GFR SERPLBLD CREATININE-BSD FMLA CKD-EPI: >60 ML/MIN/1.73/M2
GLUCOSE SERPL-MCNC: 89 MG/DL (ref 70–110)
HBA1C MFR BLD: 4.9 % (ref 4–5.6)
HDLC SERPL-MCNC: 46 MG/DL (ref 40–75)
HDLC SERPL: 23.1 % (ref 20–50)
LDLC SERPL CALC-MCNC: 130.2 MG/DL (ref 63–159)
NONHDLC SERPL-MCNC: 153 MG/DL
POTASSIUM SERPL-SCNC: 4.3 MMOL/L (ref 3.5–5.1)
PROT SERPL-MCNC: 7.3 GM/DL (ref 6–8.4)
SODIUM SERPL-SCNC: 137 MMOL/L (ref 136–145)
T4 FREE SERPL-MCNC: 1 NG/DL (ref 0.71–1.51)
TRIGL SERPL-MCNC: 114 MG/DL (ref 30–150)
TSH SERPL-ACNC: 1.14 UIU/ML (ref 0.4–4)

## 2025-08-04 PROCEDURE — 84439 ASSAY OF FREE THYROXINE: CPT

## 2025-08-04 PROCEDURE — 80061 LIPID PANEL: CPT

## 2025-08-04 PROCEDURE — 36415 COLL VENOUS BLD VENIPUNCTURE: CPT | Mod: PO

## 2025-08-04 PROCEDURE — 84443 ASSAY THYROID STIM HORMONE: CPT

## 2025-08-04 PROCEDURE — 84075 ASSAY ALKALINE PHOSPHATASE: CPT

## 2025-08-04 PROCEDURE — 83036 HEMOGLOBIN GLYCOSYLATED A1C: CPT

## 2025-08-11 ENCOUNTER — OFFICE VISIT (OUTPATIENT)
Dept: ENDOCRINOLOGY | Facility: CLINIC | Age: 45
End: 2025-08-11
Payer: COMMERCIAL

## 2025-08-11 DIAGNOSIS — E55.9 HYPOVITAMINOSIS D: ICD-10-CM

## 2025-08-11 DIAGNOSIS — E06.3 HYPOTHYROIDISM DUE TO HASHIMOTO'S THYROIDITIS: Primary | ICD-10-CM

## 2025-08-11 DIAGNOSIS — E66.9 OBESITY (BMI 30-39.9): ICD-10-CM

## 2025-08-11 PROCEDURE — 98004 SYNCH AUDIO-VIDEO EST SF 10: CPT | Mod: 95,,, | Performed by: PHYSICIAN ASSISTANT

## 2025-08-11 PROCEDURE — G2211 COMPLEX E/M VISIT ADD ON: HCPCS | Mod: 95,,, | Performed by: PHYSICIAN ASSISTANT
